# Patient Record
Sex: FEMALE | Race: WHITE | NOT HISPANIC OR LATINO | Employment: FULL TIME | URBAN - METROPOLITAN AREA
[De-identification: names, ages, dates, MRNs, and addresses within clinical notes are randomized per-mention and may not be internally consistent; named-entity substitution may affect disease eponyms.]

---

## 2017-06-07 ENCOUNTER — GENERIC CONVERSION - ENCOUNTER (OUTPATIENT)
Dept: OTHER | Facility: OTHER | Age: 37
End: 2017-06-07

## 2017-08-18 ENCOUNTER — ALLSCRIPTS OFFICE VISIT (OUTPATIENT)
Dept: OTHER | Facility: OTHER | Age: 37
End: 2017-08-18

## 2017-08-18 ENCOUNTER — LAB REQUISITION (OUTPATIENT)
Dept: LAB | Facility: HOSPITAL | Age: 37
End: 2017-08-18
Payer: COMMERCIAL

## 2017-08-18 DIAGNOSIS — Z11.3 ENCOUNTER FOR SCREENING FOR INFECTIONS WITH PREDOMINANTLY SEXUAL MODE OF TRANSMISSION: ICD-10-CM

## 2017-08-18 LAB
CHLAMYDIA DNA CVX QL NAA+PROBE: NORMAL
N GONORRHOEA DNA GENITAL QL NAA+PROBE: NORMAL

## 2017-08-18 PROCEDURE — 87591 N.GONORRHOEAE DNA AMP PROB: CPT | Performed by: PHYSICIAN ASSISTANT

## 2017-08-18 PROCEDURE — 87491 CHLMYD TRACH DNA AMP PROBE: CPT | Performed by: PHYSICIAN ASSISTANT

## 2017-10-24 NOTE — PROGRESS NOTES
Assessment  1  Encounter for gynecological examination without abnormal finding (V72 31) (Z01 419)   2  Screening for STD (sexually transmitted disease) (V74 5) (Z11 3)    Plan   Encounter for gynecological examination without abnormal finding    · Lo Loestrin Fe 1 MG-10 MCG / 10 MCG Oral Tablet; One po daily   Rx By: Antony Elizabeth; Dispense: 90 Days ; #:90 Tablet; Refill: 3;For: Encounter for gynecological examination without abnormal finding; BORIS = N; Verified Transmission to [facility] #[id number] Last Updated By: System, [employer]; 8/18/2017 9:29:46 AM  Screening for STD (sexually transmitted disease)    · (1) ACUTE HEPATITIS PANEL; Status:Active; Requested for:67Uij1272;    Perform:[facility]; Due:86Til0982; Ordered; For:Screening for STD (sexually transmitted disease); Ordered By:Lise Jennings;   · (1) HIV AG/AB COMBO, 4TH GEN; [Do Not Release]; Status:Active; Requested  for:39Rsz4239;    Perform:[facility]; Due:69Whe2312; Ordered; For:Screening for STD (sexually transmitted disease); Ordered By:Lise Jennings;   · (1) RPR; Status:Active; Requested for:31Uxi4726;    Perform:[facility]; Due:07Mez6063; Ordered; For:Screening for STD (sexually transmitted disease); Ordered By:Lise Jennings;    (1) CHLAMYDIA/GC AMPLIFIED DNA, PCR; Source:Vaginal; Status:Resulted - Requires Verification;   Done: 67RUN5365 12:00AM  Due:13Rdy4802; Ordered; For:Screening for STD (sexually transmitted disease); Ordered By:Lise Jennings; Discussion/Summary  healthy adult female the risks and benefits of cervical cancer screening were discussed cervical cancer screening is current Breast cancer screening: the risks and benefits of breast cancer screening were discussed and monthly self breast exam was advised  Advice and education were given regarding weight bearing exercise, calcium supplements and vitamin D supplements        Chief Complaint  Pt is here for her yearly exam      History of Present Illness  HPI: 30year old white female here for yearly exam  She reports that six months ago her  passed away unexpectedly  She says he was lying on the sofa and resting and started breathing weird and then became very sweaty and then stopped breathing and she couldn't find his pulse  She called her sister who is a nurse and her sister suggested she call an ambulance  She says that they resuscitated him at the hospital but by 6am they gave up  Pt is laughing and inappropriate walk talking about this  She says she really isn't all that upset about it  She is now dating again and requests STD testing because she suspects her  was cheating while he was alive  She says she requests birth control pills because she is dating again  GYN HM, Adult Female [address] [facility]: The patient is being seen for a health maintenance evaluation  The last health maintenance visit was 4 year(s) ago  General Health: The patient's health since the last visit is described as good  Lifestyle:  She consumes a diverse and healthy diet  -- She does not have any weight concerns  -- She does not exercise regularly  -- She does not use tobacco -- She consumes alcohol  She reports occasional alcohol use  -- She denies drug use  Reproductive health: the patient is premenopausal--   she reports menstrual problems  Menstrual history: The cycles are irregular  -- she uses no contraception  -- she is not sexually active  -- pregnancy history: G 1P 0,-- 0(elective abortions: 1 )  Screening: Cervical cancer screening includes a pap smear performed 3/7/2013,neg  -- and-- human papilloma virus screening performed 3/7/2013,neg  Breast cancer screening includes no previous mammogram  Colorectal cancer screening includes no previous colonoscopy  Active Problems  1  Cervical kyphosis (737 10) (M40 202)   2   Dysmenorrhea (625 3) (N94 6)   3  Screening for STD (sexually transmitted disease) (V74 5) (Z11 3)    Past Medical History   · History of C5 vertebral fracture (805 05) (S12 400A)   · History of Cyst of ovary, right (620 2) (N83 201)    Surgical History   · History of Neck Surgery    Family History  Mother    · Family history of In good condition  Father    · Family history of In good condition  Maternal Grandmother    · Family history of    · Family history of myocardial infarction (V17 3) (Z82 49)  Maternal Grandfather    · Family history of    · Family history of Mesothelioma  Paternal Grandfather    · Family history of    · Family history of myocardial infarction (V17 3) (Z82 49)  Family History    · Family history of Diabetes Mellitus (V18 0)    Social History   · Completed college   · Currently sexually active   · Former smoker (V15 82) (Z62 686)   · Denied: History of drug use   ·    · Occupation   ·    · Social alcohol use (Z78 9)    Current Meds   1  No Reported Medications Recorded    Allergies  1  No Known Drug Allergies    Vitals   Recorded:  43:56HS   Systolic 687, LUE, Sitting   Diastolic 96, LUE, Sitting   Height 5 ft 6 in   Weight 158 lb    BMI Calculated 25 5   BSA Calculated 1 81   LMP [id number]     Physical Exam    Constitutional   General appearance: No acute distress, well appearing and well nourished  Neck   Neck: Normal, supple, trachea midline, no masses  Genitourinary   External genitalia: Normal and no lesions appreciated  Vagina: Normal, no lesions or dryness appreciated  Urethra: Normal     Urethral meatus: Normal     Bladder: Normal, soft, non-tender and no prolapse or masses appreciated  Cervix: Normal, no palpable masses  Uterus: Normal, non-tender, not enlarged, and no palpable masses  Adnexa/parametria: Normal, non-tender and no fullness or masses appreciated  Chest   Breasts: Normal and no dimpling or skin changes noted  Abdomen   Abdomen: Normal, non-tender, and no organomegaly noted         Future Appointments    Date/Time Provider Specialty Site   2018 09:00 AM Angie Fields, [facility] Obstetrics/Gynecology [facility] OB     Signatures   Electronically signed by : Sil Padilla, [facility];  Aug 18 2017  9:37AM EST                       (Author)    Electronically signed by : LEYDI York ; Aug 25 2017  2:34PM EST                       (Author)

## 2017-12-04 ENCOUNTER — GENERIC CONVERSION - ENCOUNTER (OUTPATIENT)
Dept: OTHER | Facility: OTHER | Age: 37
End: 2017-12-04

## 2017-12-28 ENCOUNTER — OFFICE VISIT (OUTPATIENT)
Dept: URGENT CARE | Age: 37
End: 2017-12-28
Payer: COMMERCIAL

## 2017-12-28 PROCEDURE — 99213 OFFICE O/P EST LOW 20 MIN: CPT | Performed by: FAMILY MEDICINE

## 2017-12-29 NOTE — PROGRESS NOTES
Assessment   1  Acute upper respiratory infection (465 9) (J06 9)    Discussion/Summary   Discussion Summary:    Take Mucinex as directed, for congestion  Take Advil or Tylenol as directed for muscle aches and fever  Use saline spray and cough medicine as directed  Use cool mist humidifier, turning on hours prior to bedtime for maximum relief  Take all medications with food and a full glass of water  Get plenty of rest and lots of fluids  Use throat lozenges, saltwater gargle, and warm honey water as needed for throat relief  If symptoms worsen or if not resolving, call PCP or go to the ER  Medication Side Effects Reviewed: Possible side effects of new medications were reviewed with the patient/guardian today  Understands and agrees with treatment plan: The treatment plan was reviewed with the patient/guardian  The patient/guardian understands and agrees with the treatment plan    Counseling Documentation With Imm: The patient was counseled regarding instructions for management,-- risk factor reductions,-- patient and family education,-- impressions  Follow Up Instructions: Follow Up with your Primary Care Provider in 7 days  If your symptoms worsen, go to the nearest [place] [address] Emergency Department  Chief Complaint   1  Cold Symptoms  Chief Complaint Free Text Note Form: URI on and off x several weeks  Since Tuesday - has nasal congestion/rhinorrhea with PND, b/l ear pressure, burning eyes and HA in forehead and occ  dry cough  Taking Emergen C , Sudafed and Benadryl  History of Present Illness   HPI: Patient is a 29 y/o female presenting with complaint of sore throat x 2 days  Sx  associate with b/l ear pressure PND, nasal burning and dryness, morning time sinus congestion, and intermittently productive cough  Treating with sudafed, and benadryl  She notes she has been sick on and off over the past month with similar sx  usually lasting 2-3 days   Last illness was over 1 week ago, requiring no abx  Multiple sick contacts, no recent travel  Hospital Based Practices Required Assessment:      Pain Assessment      the patient states they have pain  The pain is located in the nasal congestion and ears  (on a scale of 0 to 10, the patient rates the pain at 6 )      Abuse And Domestic Violence Screen       Yes, the patient is safe at home  -- The patient states no one is hurting them  Depression And Suicide Screen  No, the patient has not had thoughts of hurting themself  No, the patient has not felt depressed in the past 7 days  Prefered Language is  [place]  Primary Language is  English  Review of Systems   Focused-Female:      Constitutional: feeling poorly-- and-- feeling tired, but-- no fever-- and-- no chills  ENT: as noted in HPI-- and-- no nasal discharge  Cardiovascular: no chest pain-- and-- no palpitations  Respiratory: PND, but-- no shortness of breath-- and-- no wheezing  Gastrointestinal: no abdominal pain,-- no nausea,-- no vomiting-- and-- no diarrhea  Musculoskeletal: no myalgias  Integumentary: no rashes  Neurological: headache  ROS Reviewed:    ROS reviewed  Active Problems   1  Cervical kyphosis (737 10) (M40 202)   2  Dysmenorrhea (625 3) (N94 6)   3  Encounter for gynecological examination without abnormal finding (V72 31) (Z01 419)   4  Screening for STD (sexually transmitted disease) (V74 5) (Z11 3)   5  Seasonal allergies (477 9) (J30 2)    Past Medical History   1  History of C5 vertebral fracture (805 05) (S12 400A)   2  History of Cyst of ovary, right (620 2) (N83 201)  Active Problems And Past Medical History Reviewed: The active problems and past medical history were reviewed and updated today  Family History   Mother    1  Family history of In good condition  Father    2  Family history of In good condition  Maternal Grandmother    3  Family history of    4   Family history of myocardial infarction (V17 3) (Z82 49)  Maternal Grandfather    5  Family history of    6  Family history of Mesothelioma  Paternal Grandfather    9  Family history of    6  Family history of myocardial infarction (V17 3) (Z82 49)  Family History    9  Family history of Diabetes Mellitus (V18 0)  Family History Reviewed: The family history was reviewed and updated today  Social History    · Completed college   · Currently sexually active   · Former smoker (V15 82) (A96 240)   · Denied: History of drug use   ·    · Occupation   · Social alcohol use (Z78 9)  Social History Reviewed: The social history was reviewed and updated today  The social history was reviewed and is unchanged  Surgical History   1  History of Neck Surgery  Surgical History Reviewed: The surgical history was reviewed and updated today  Current Meds    1  [employer]ive [employer] TABS; Therapy: (Recorded:61Mwb0307) to Recorded   2  [facility] Allergy CAPS; Therapy: (Recorded:94Zfl9665) to Recorded  Medication List Reviewed: The medication list was reviewed and updated today  Allergies   1  No Known Drug Allergies    Vitals   Signs   Recorded: 06Qlq7331 05:30PM   Temperature: 98 6 F, Oral  Heart Rate: 88  Pulse Quality: Regular  Respiration: 18  Systolic: 583, RUE, Sitting  Diastolic: 80, RUE, Sitting  Height: 5 ft 6 in  Weight: 164 lb   BMI Calculated: 26 47  BSA Calculated: 1 84  O2 Saturation: 97  LMP: 73BOP4105  Pain Scale: 6    Physical Exam        Constitutional      General appearance: No acute distress, well appearing and well nourished  Eyes      Conjunctiva and lids: No swelling, erythema or discharge  Ears, Nose, Mouth, and Throat      External inspection of ears and nose: Normal        Otoscopic examination: Tympanic membranes translucent with normal light reflex  Canals patent without erythema  -- Dry, erythematous nasal mucosa  No inflammation of mucosa or turbinates  -- Erythematous posterior pharynx with large amount of clear PND  No exudate, edema, petechiae, or other lesions  Pulmonary      Respiratory effort: No increased work of breathing or signs of respiratory distress  Auscultation of lungs: Clear to auscultation  Cardiovascular      Auscultation of heart: Normal rate and rhythm, normal S1 and S2, without murmurs  Abdomen      Abdomen: Non-tender, no masses  Liver and spleen: No hepatomegaly or splenomegaly  Lymphatic      Palpation of lymph nodes in neck: No lymphadenopathy  Musculoskeletal      Gait and station: Normal        Digits and nails: Normal without clubbing or cyanosis  Neurologic No focal deficits  Reflexes: 2+ and symmetric  Psychiatric      Orientation to person, place, and time: Normal        Mood and affect: Normal        Message   Return to work or school:    Abdiel Nicole is under my professional care  She was seen in my office on 12/28/2017    She is able to return to work on  12/29/2017          Benedict Phalen, PAC        Future Appointments      Date/Time Provider Specialty Site   08/23/2018 09:00 AM Farrukh Bhat [facility] Obstetrics/Gynecology [facility] OB     Signatures    Electronically signed by : Benedict Phalen, [facility]; Dec 28 2017  5:59PM EST                       (Author)     Electronically signed by : Mimi Cordova DO; Dec 28 2017  6:14PM EST                       (Co-author)

## 2018-01-14 VITALS
SYSTOLIC BLOOD PRESSURE: 140 MMHG | DIASTOLIC BLOOD PRESSURE: 96 MMHG | BODY MASS INDEX: 25.39 KG/M2 | HEIGHT: 66 IN | WEIGHT: 158 LBS

## 2018-01-16 NOTE — MISCELLANEOUS
Message   Recorded as Task   Date: 06/07/2017 01:41 PM, Created By: Juan Diego Chi   Task Name: Care Coordination   Assigned To: Juan Diego Chi   Regarding Patient: Zoya Burdick, Status: Active   Comment:    Dulce Maria Henry - 07 Jun 2017 1:41 PM     TASK CREATED  Pt inquired if she could have xrays done at the dentist   Message left informing her no restrictions from neurosurgery standpoint   She is s/p cervical surgery 2015        Signatures   Electronically signed by : Davon Sanchez, ; Jun 7 2017  1:42PM EST                       (Author)

## 2018-01-23 VITALS
RESPIRATION RATE: 18 BRPM | BODY MASS INDEX: 26.36 KG/M2 | HEIGHT: 66 IN | WEIGHT: 164 LBS | TEMPERATURE: 98.6 F | DIASTOLIC BLOOD PRESSURE: 80 MMHG | HEART RATE: 88 BPM | SYSTOLIC BLOOD PRESSURE: 120 MMHG | OXYGEN SATURATION: 97 %

## 2018-01-23 NOTE — MISCELLANEOUS
Message   Recorded as Task   Date: 11/20/2017 10:36 AM, Created By: Leny Ochoa   Task Name: Care Coordination   Assigned To: Sherin Ambrose   Regarding Patient: Shwetha Preciado, Status: Active   Comment:    Amanda Chicas - 20 Nov 2017 10:36 AM     TASK CREATED  Caller: Self; Care Coordination; [phone] (Home); [phone] (Work)  Pt left vm - needs a call to talk about her birth control  Amanda Chicas - 20 Nov 2017 11:41 AM     TASK IN PROGRESS   Amanda Chicas - 20 Nov 2017 11:50 AM     TASK EDITED  [facility] for pt to cb       ext: [id number]   Mindy Swift - 21 Nov 2017 8:17 AM     TASK EDITED  spoke with pt, she is calling ins to check on covered generics    she will callback   Amanda Chicas - 22 Nov 2017 2:01 PM     TASK EDITED   Nicholas Hilliard - 27 Nov 2017 10:52 AM     TASK EDITED  Await cb   Nicholas Hilliard - 27 Nov 2017 10:52 AM     TASK EDITED   Mindy Swift - 28 Nov 2017 7:16 AM     TASK EDITED  still no callback from pt after many attempts   will await pts call   Nicholas Hilliard - 04 Dec 2017 9:26 AM     TASK EDITED  Will delete as pt has not cb        Active Problems    1  Cervical kyphosis (737 10) (M40 202)   2  Dysmenorrhea (625 3) (N94 6)   3  Encounter for gynecological examination without abnormal finding (V72 31) (Z01 419)   4  Screening for STD (sexually transmitted disease) (V74 5) (Z11 3)    Current Meds   1  Lo Loestrin Fe 1 MG-10 MCG / 10 MCG Oral Tablet; One po daily; Therapy: 81ZDL3718 to (Evaluate:40Pwx5621)  Requested for: 20WZZ6180; Last   Rx:[id number] Ordered    Allergies    1  No Known Drug Allergies    Signatures   Electronically signed by :  Rekha Wang, ; Dec  4 2017  9:27AM EST                       (Author)

## 2018-01-24 NOTE — MISCELLANEOUS
Message  Return to work or school:   Pamela Baptiste is under my professional care  She was seen in my office on 12/28/2017   She is able to return to work on  12/29/2017       JARAD Martini        Future Appointments    Signatures   Electronically signed by : Merari Garrido, [facility]; Dec 28 2017  5:59PM EST                       (Author)

## 2018-09-17 ENCOUNTER — TELEPHONE (OUTPATIENT)
Dept: OBGYN CLINIC | Age: 38
End: 2018-09-17

## 2018-09-17 DIAGNOSIS — Z01.419 ENCOUNTER FOR GYNECOLOGICAL EXAMINATION WITHOUT ABNORMAL FINDING: Primary | ICD-10-CM

## 2018-09-17 RX ORDER — NORETHINDRONE ACETATE AND ETHINYL ESTRADIOL, ETHINYL ESTRADIOL AND FERROUS FUMARATE 1MG-10(24)
KIT ORAL
Qty: 84 TABLET | Refills: 0 | Status: SHIPPED | OUTPATIENT
Start: 2018-09-17 | End: 2018-09-17 | Stop reason: SDUPTHER

## 2018-09-17 NOTE — TELEPHONE ENCOUNTER
Pt is inquiring about prescription to be filled of the [facility] to be sent to [facility] on file, pt has her yearly schedule in Oct

## 2018-12-12 ENCOUNTER — ANNUAL EXAM (OUTPATIENT)
Dept: OBGYN CLINIC | Facility: CLINIC | Age: 38
End: 2018-12-12
Payer: COMMERCIAL

## 2018-12-12 VITALS
HEIGHT: 65 IN | SYSTOLIC BLOOD PRESSURE: 110 MMHG | DIASTOLIC BLOOD PRESSURE: 62 MMHG | BODY MASS INDEX: 27.82 KG/M2 | WEIGHT: 167 LBS

## 2018-12-12 DIAGNOSIS — Z01.419 ENCOUNTER FOR GYNECOLOGICAL EXAMINATION WITHOUT ABNORMAL FINDING: ICD-10-CM

## 2018-12-12 DIAGNOSIS — Z01.419 ENCNTR FOR GYN EXAM (GENERAL) (ROUTINE) W/O ABN FINDINGS: Primary | ICD-10-CM

## 2018-12-12 PROCEDURE — G0145 SCR C/V CYTO,THINLAYER,RESCR: HCPCS | Performed by: PATHOLOGY

## 2018-12-12 PROCEDURE — S0612 ANNUAL GYNECOLOGICAL EXAMINA: HCPCS | Performed by: PHYSICIAN ASSISTANT

## 2018-12-12 PROCEDURE — 88141 CYTOPATH C/V INTERPRET: CPT | Performed by: PATHOLOGY

## 2018-12-12 PROCEDURE — 87624 HPV HI-RISK TYP POOLED RSLT: CPT | Performed by: PHYSICIAN ASSISTANT

## 2018-12-12 NOTE — PROGRESS NOTES
Milana Jaimie  1980      CC:  Yearly exam    S:  41 y o  female here for yearly exam   She is sexually active with the same partner who is "a keeper for now " She uses Lo Loestrin for contraception and is amenorrheic on this  Last Pap 2013 neg/neg    Current Outpatient Prescriptions:     Cetirizine HCl (ZYRTEC ALLERGY) 10 MG CAPS, Take by mouth, Disp: , Rfl:     Norethin-Eth Estrad-Fe Biphas (LO LOESTRIN FE) 1 MG-10 MCG / 10 MCG TABS, Take 1 tablet by mouth daily, Disp: 84 tablet, Rfl: 0  Social History     Social History    Marital status:      Spouse name: N/A    Number of children: N/A    Years of education: N/A     Occupational History          Social History Main Topics    Smoking status: Former Smoker    Smokeless tobacco: Never Used    Alcohol use Yes      Comment: social    Drug use: No    Sexual activity: Yes     Partners: Male     Birth control/ protection: OCP     Other Topics Concern    Not on file     Social History Narrative    Completed college     Family History   Problem Relation Age of Onset    No Known Problems Mother     No Known Problems Father     Heart attack Maternal Grandmother     Other Maternal Grandfather         mesothelioma    Heart attack Paternal Grandfather     Diabetes Family       Past Medical History:   Diagnosis Date    C5 vertebral fracture ([facility] [address] )     Last Assessed:2/1/2016    Cyst of ovary, right 11/07/2007        O:  Blood pressure 110/62, height 5' 4 96" (1 65 m), weight 75 8 kg (167 lb)  Patient appears well and is not in distress  Neck is supple without masses  Breasts are symmetrical without mass, tenderness, nipple discharge, skin changes or adenopathy  Abdomen is soft and nontender without masses  External genitals are normal without lesions or rashes  Vagina is normal without discharge or bleeding  Cervix is normal without discharge or lesion     Uterus is normal, mobile, nontender without palpable mass   Adnexa are normal, nontender, without palpable mass  A:  Yearly exam      P:   Pap and HPV today    Lo Loestrin sent to Pharmacy    RTO one year for yearly exam or sooner as needed

## 2018-12-19 LAB
HPV HR 12 DNA CVX QL NAA+PROBE: POSITIVE
HPV16 DNA CVX QL NAA+PROBE: NEGATIVE
HPV18 DNA CVX QL NAA+PROBE: NEGATIVE

## 2018-12-21 LAB
LAB AP GYN PRIMARY INTERPRETATION: NORMAL
Lab: NORMAL
PATH INTERP SPEC-IMP: NORMAL

## 2018-12-26 ENCOUNTER — TELEPHONE (OUTPATIENT)
Dept: OBGYN CLINIC | Facility: CLINIC | Age: 38
End: 2018-12-26

## 2018-12-26 NOTE — TELEPHONE ENCOUNTER
----- Message from Cece Robison PA-C sent at 12/21/2018  3:30 PM EST -----  Pap ascus +HPV -needs colpo

## 2018-12-26 NOTE — TELEPHONE ENCOUNTER
----- Message from Floresita Manning PA-C sent at 12/21/2018  3:30 PM EST -----  Pap ascus +HPV -needs colpo

## 2018-12-27 NOTE — TELEPHONE ENCOUNTER
Spoke with pt - she is aware of her pap results and that she needs to have a colpo done  Explained what a colpo was and how it is done  Advised her to take 2-3 Ibuprofen an hour before hand and to eat something  She is scheduled for 01/08 with WAYLON

## 2019-01-08 ENCOUNTER — PROCEDURE VISIT (OUTPATIENT)
Dept: OBGYN CLINIC | Facility: CLINIC | Age: 39
End: 2019-01-08
Payer: COMMERCIAL

## 2019-01-08 VITALS — WEIGHT: 169 LBS | SYSTOLIC BLOOD PRESSURE: 122 MMHG | BODY MASS INDEX: 28.16 KG/M2 | DIASTOLIC BLOOD PRESSURE: 64 MMHG

## 2019-01-08 DIAGNOSIS — R87.610 ASCUS WITH POSITIVE HIGH RISK HPV CERVICAL: Primary | ICD-10-CM

## 2019-01-08 DIAGNOSIS — R87.810 ASCUS WITH POSITIVE HIGH RISK HPV CERVICAL: Primary | ICD-10-CM

## 2019-01-08 PROCEDURE — 57454 BX/CURETT OF CERVIX W/SCOPE: CPT | Performed by: OBSTETRICS & GYNECOLOGY

## 2019-01-08 PROCEDURE — 88305 TISSUE EXAM BY PATHOLOGIST: CPT | Performed by: PATHOLOGY

## 2019-01-08 NOTE — PROGRESS NOTES
Colposcopy  Date/Time: 1/8/2019 11:25 AM  Performed by: Cleveland Berumen  Authorized by: Cleveland Berumen     Consent:     Consent obtained:  Verbal and written    Procedural risks discussed:  Bleeding, infection, repeat procedure and possible continued pain    Patient questions answered: yes      Patient agrees, verbalizes understanding, and wants to proceed: yes    Indication:     Indication:  ASC-US  Procedure:     Procedure: Colposcopy w/ cervical biopsy and ECC      Lampasas speculum was placed in the vagina: yes      Under colposcopic examination the transition zone was seen in entirety: yes      Endocervix was curetted using a Kevorkian curette: yes      Cervical biopsy performed with a cervical biopsy punch: yes      Monsel's solution was applied: yes      Biopsy(s): yes      Location:  ECC and 5 o'clock    Specimen to pathology: yes    Post-procedure:     Findings: Punctation and White epithelium      Impression: Low grade cervical dysplasia      Patient tolerance of procedure:   Tolerated well, no immediate complications

## 2019-01-09 ENCOUNTER — TELEPHONE (OUTPATIENT)
Dept: OBGYN CLINIC | Facility: CLINIC | Age: 39
End: 2019-01-09

## 2019-01-09 NOTE — TELEPHONE ENCOUNTER
Per Patient consent form, a voice message was left with Colposcopy results that were negative for Dysplasia  Patient needs to follow up with a 6 month repap

## 2019-03-28 ENCOUNTER — APPOINTMENT (OUTPATIENT)
Dept: RADIOLOGY | Age: 39
End: 2019-03-28
Attending: PHYSICIAN ASSISTANT
Payer: COMMERCIAL

## 2019-03-28 ENCOUNTER — OFFICE VISIT (OUTPATIENT)
Dept: URGENT CARE | Age: 39
End: 2019-03-28
Payer: COMMERCIAL

## 2019-03-28 VITALS
TEMPERATURE: 97.9 F | HEART RATE: 79 BPM | RESPIRATION RATE: 18 BRPM | BODY MASS INDEX: 26.03 KG/M2 | SYSTOLIC BLOOD PRESSURE: 103 MMHG | HEIGHT: 66 IN | DIASTOLIC BLOOD PRESSURE: 69 MMHG | WEIGHT: 162 LBS | OXYGEN SATURATION: 100 %

## 2019-03-28 DIAGNOSIS — R07.81 RIB PAIN ON RIGHT SIDE: ICD-10-CM

## 2019-03-28 DIAGNOSIS — S20.221A BACK CONTUSION, RIGHT, INITIAL ENCOUNTER: Primary | ICD-10-CM

## 2019-03-28 DIAGNOSIS — S20.221A BACK CONTUSION, RIGHT, INITIAL ENCOUNTER: ICD-10-CM

## 2019-03-28 PROCEDURE — S9083 URGENT CARE CENTER GLOBAL: HCPCS | Performed by: FAMILY MEDICINE

## 2019-03-28 PROCEDURE — G0382 LEV 3 HOSP TYPE B ED VISIT: HCPCS | Performed by: FAMILY MEDICINE

## 2019-03-28 PROCEDURE — 71101 X-RAY EXAM UNILAT RIBS/CHEST: CPT

## 2019-03-28 RX ORDER — METAXALONE 800 MG/1
800 TABLET ORAL EVERY 8 HOURS PRN
Qty: 21 TABLET | Refills: 0 | Status: SHIPPED | OUTPATIENT
Start: 2019-03-28 | End: 2020-01-31 | Stop reason: ALTCHOICE

## 2019-03-28 NOTE — PROGRESS NOTES
[facility]        NAME: Quoc De La Garza is a 42 y o  female  : 1980    MRN: [id number]  DATE: 2019  TIME: 5:40 PM    Assessment and Plan   Back contusion, right, initial encounter [S20 221A]  1  Back contusion, right, initial encounter  XR ribs right w pa chest min 3 views    metaxalone (SKELAXIN) 800 mg tablet   2  Rib pain on right side  XR ribs right w pa chest min 3 views         Patient Instructions       Follow up with PCP in 3-5 days  Proceed to  ER if symptoms worsen  Chief Complaint     Chief Complaint   Patient presents with    Back Pain     pt states on saturday while hiking the bake oven knop fell and hit middle back on rock  History of Present Illness       Patient here for evaluation of right mid to low back pain  Patient was hiking and jump down and lost her footing and landed in a seated position hit her back on the way down  She has some pain and bruising of the right mid to low back  Does have some pain with certain movements and stiffness in the morning when she gets up  She denies any shortness of breath, dyspnea on exertion  Review of Systems   Review of Systems   Constitutional: Negative  Respiratory: Negative  Cardiovascular: Positive for chest pain  Negative for leg swelling           Current Medications       Current Outpatient Medications:     Norethin-Eth Estrad-Fe Biphas (LO LOESTRIN FE) 1 MG-10 MCG / 10 MCG TABS, Take 1 tablet by mouth daily, Disp: 84 tablet, Rfl: 3    Cetirizine HCl (ZYRTEC ALLERGY) 10 MG CAPS, Take by mouth, Disp: , Rfl:     metaxalone (SKELAXIN) 800 mg tablet, Take 1 tablet (800 mg total) by mouth every 8 (eight) hours as needed for muscle spasms, Disp: 21 tablet, Rfl: 0    Current Allergies     Allergies as of 2019    (No Known Allergies)            The following portions of the patient's history were reviewed and updated as appropriate: allergies, current medications, past family history, past medical history, past social history, past surgical history and problem list      Past Medical History:   Diagnosis Date    C5 vertebral fracture ([address] )     Last Assessed:2/1/2016    Cyst of ovary, right 11/07/2007       Past Surgical History:   Procedure Laterality Date    NECK SURGERY  08/05/2015    1  Anterior C5 corpectomy with greater than 80% of the verebral body removed2  C4-C5,C5-C6(2 level)ianterior cervical discectomy3  Placement of -C6(2 level) interbody PEEK cage for 2 level interbody fusion4  C4-C6(2 level) anterior titanium plate and screw instrumentation for anterior cervical fusion5  Use of microscopy for microdissection/corpectomy6  Harvesting of autologous bone for                         Family History   Problem Relation Age of Onset    No Known Problems Mother     No Known Problems Father     Heart attack Maternal Grandmother     Other Maternal Grandfather         mesothelioma    Heart attack Paternal Grandfather     Diabetes Family          Medications have been verified  Objective   /69 (BP Location: Left arm, Patient Position: Sitting)   Pulse 79   Temp 97 9 °F (36 6 °C) (Temporal)   Resp 18   Ht 5' 6" (1 676 m)   Wt 73 5 kg (162 lb)   LMP 03/14/2019   SpO2 100%   BMI 26 15 kg/m²        Physical Exam     Physical Exam   Constitutional: She is oriented to person, place, and time  She appears well-developed and well-nourished  No distress  HENT:   Head: Normocephalic and atraumatic  Pulmonary/Chest: Effort normal and breath sounds normal  No stridor  No respiratory distress  She has no wheezes  She has no rales  She exhibits tenderness (Right lower back over the lower ribs with some focal ecchymosis present  No other bony point tenderness  )  Musculoskeletal: Normal range of motion  Neurological: She is alert and oriented to person, place, and time  Skin: Skin is warm and dry  She is not diaphoretic  Psychiatric: She has a normal mood and affect   Her behavior is normal    Nursing note and vitals reviewed       X-ray of the right ribs and PA chest showed no acute findings

## 2019-03-28 NOTE — PATIENT INSTRUCTIONS
Continue with current treatment plan  Use moist heat frequently as directed    Follow-up with your primary care physician if symptoms persist    Go to the emergency room if you have any worsening pain or shortness of breath

## 2019-06-18 ENCOUNTER — OFFICE VISIT (OUTPATIENT)
Dept: OBGYN CLINIC | Facility: CLINIC | Age: 39
End: 2019-06-18
Payer: COMMERCIAL

## 2019-06-18 VITALS — WEIGHT: 163.4 LBS | BODY MASS INDEX: 26.37 KG/M2 | DIASTOLIC BLOOD PRESSURE: 74 MMHG | SYSTOLIC BLOOD PRESSURE: 114 MMHG

## 2019-06-18 DIAGNOSIS — R87.610 ASCUS WITH POSITIVE HIGH RISK HPV CERVICAL: Primary | ICD-10-CM

## 2019-06-18 DIAGNOSIS — R87.810 ASCUS WITH POSITIVE HIGH RISK HPV CERVICAL: Primary | ICD-10-CM

## 2019-06-18 PROCEDURE — 99213 OFFICE O/P EST LOW 20 MIN: CPT | Performed by: OBSTETRICS & GYNECOLOGY

## 2019-06-18 PROCEDURE — 88141 CYTOPATH C/V INTERPRET: CPT | Performed by: PATHOLOGY

## 2019-06-18 PROCEDURE — 87624 HPV HI-RISK TYP POOLED RSLT: CPT | Performed by: OBSTETRICS & GYNECOLOGY

## 2019-06-18 PROCEDURE — G0145 SCR C/V CYTO,THINLAYER,RESCR: HCPCS | Performed by: PATHOLOGY

## 2019-06-25 ENCOUNTER — TELEPHONE (OUTPATIENT)
Dept: OBGYN CLINIC | Facility: CLINIC | Age: 39
End: 2019-06-25

## 2019-06-26 ENCOUNTER — TELEPHONE (OUTPATIENT)
Dept: OBGYN CLINIC | Facility: CLINIC | Age: 39
End: 2019-06-26

## 2019-06-26 LAB
LAB AP GYN PRIMARY INTERPRETATION: ABNORMAL
Lab: ABNORMAL
PATH INTERP SPEC-IMP: ABNORMAL

## 2019-12-18 DIAGNOSIS — Z01.419 ENCOUNTER FOR GYNECOLOGICAL EXAMINATION WITHOUT ABNORMAL FINDING: ICD-10-CM

## 2019-12-18 RX ORDER — NORETHINDRONE ACETATE AND ETHINYL ESTRADIOL, ETHINYL ESTRADIOL AND FERROUS FUMARATE 1MG-10(24)
KIT ORAL
Qty: 84 TABLET | Refills: 0 | Status: SHIPPED | OUTPATIENT
Start: 2019-12-18 | End: 2020-01-31 | Stop reason: SDUPTHER

## 2020-01-31 ENCOUNTER — ANNUAL EXAM (OUTPATIENT)
Dept: OBGYN CLINIC | Facility: CLINIC | Age: 40
End: 2020-01-31
Payer: COMMERCIAL

## 2020-01-31 VITALS
BODY MASS INDEX: 27.49 KG/M2 | WEIGHT: 165 LBS | HEIGHT: 65 IN | DIASTOLIC BLOOD PRESSURE: 72 MMHG | SYSTOLIC BLOOD PRESSURE: 110 MMHG

## 2020-01-31 DIAGNOSIS — R87.610 ASCUS WITH POSITIVE HIGH RISK HPV CERVICAL: ICD-10-CM

## 2020-01-31 DIAGNOSIS — R87.810 ASCUS WITH POSITIVE HIGH RISK HPV CERVICAL: ICD-10-CM

## 2020-01-31 DIAGNOSIS — Z01.419 ENCNTR FOR GYN EXAM (GENERAL) (ROUTINE) W/O ABN FINDINGS: Primary | ICD-10-CM

## 2020-01-31 DIAGNOSIS — Z01.419 ENCOUNTER FOR GYNECOLOGICAL EXAMINATION WITHOUT ABNORMAL FINDING: ICD-10-CM

## 2020-01-31 PROCEDURE — S0612 ANNUAL GYNECOLOGICAL EXAMINA: HCPCS | Performed by: PHYSICIAN ASSISTANT

## 2020-01-31 PROCEDURE — G0145 SCR C/V CYTO,THINLAYER,RESCR: HCPCS | Performed by: PHYSICIAN ASSISTANT

## 2020-01-31 PROCEDURE — 87624 HPV HI-RISK TYP POOLED RSLT: CPT | Performed by: PHYSICIAN ASSISTANT

## 2020-01-31 NOTE — PROGRESS NOTES
Sunday Anson  1980      CC:  Yearly exam    S:  47 y o  female here for yearly exam      Sexual activity: She is sexually active the same partner without pain, bleeding or dryness  She is now engaged to be   Contraception: She uses Lo Loestrin for contraception  She is amenorrheic on this  STD testing:  She does not want STD testing today  Gardasil:  She has not had the Gardasil series  Pap 6/18/19 ASCUS +HPV  Colpo 1/8/19 benign  Pap 12/12/18 ASCUS +HPV   Last Mammo never    We reviewed ASCCP guidelines for Pap testing today  Family hx of breast cancer: no  Family hx of ovarian cancer: no  Family hx of colon cancer: no    Current Outpatient Medications:     LO LOESTRIN FE 1 MG-10 MCG / 10 MCG TABS, TAKE 1 TABLET BY MOUTH EVERY DAY, Disp: 84 tablet, Rfl: 0  Social History     Socioeconomic History    Marital status:       Spouse name: Not on file    Number of children: Not on file    Years of education: Not on file    Highest education level: Not on file   Occupational History    Occupation:    Social Needs    Financial resource strain: Not on file    Food insecurity:     Worry: Not on file     Inability: Not on file    Transportation needs:     Medical: Not on file     Non-medical: Not on file   Tobacco Use    Smoking status: Former Smoker    Smokeless tobacco: Never Used   Substance and Sexual Activity    Alcohol use: Yes     Comment: social    Drug use: No    Sexual activity: Yes     Partners: Male     Birth control/protection: OCP   Lifestyle    Physical activity:     Days per week: Not on file     Minutes per session: Not on file    Stress: Not on file   Relationships    Social connections:     Talks on phone: Not on file     Gets together: Not on file     Attends Anabaptist service: Not on file     Active member of club or organization: Not on file     Attends meetings of clubs or organizations: Not on file     Relationship status: Not on file    Intimate partner violence:     Fear of current or ex partner: Not on file     Emotionally abused: Not on file     Physically abused: Not on file     Forced sexual activity: Not on file   Other Topics Concern    Not on file   Social History Narrative    Completed college     Family History   Problem Relation Age of Onset    No Known Problems Mother     No Known Problems Father     Heart attack Maternal Grandmother     Other Maternal Grandfather         mesothelioma    Heart attack Paternal Grandfather     Diabetes Family       Past Medical History:   Diagnosis Date    Abnormal Pap smear of cervix     C5 [address] fracture ([facility] [address] )     Last Assessed:2/1/2016    Cyst of ovary, right 11/07/2007    HPV (human papilloma virus) infection         Review of Systems   Respiratory: Negative  Cardiovascular: Negative  Gastrointestinal: Negative for constipation and diarrhea  Genitourinary: Negative for difficulty urinating, pelvic pain, vaginal bleeding, vaginal discharge, itching or odor  O:  Blood pressure 110/72, height 5' 5 35" (1 66 m), weight 74 8 kg (165 lb)  Patient appears well and is not in distress  Neck is supple without masses  Breasts are symmetrical without mass, tenderness, nipple discharge, skin changes or adenopathy  Abdomen is soft and nontender without masses  External genitals are normal without lesions or rashes  Urethral meatus and urethra are normal  Bladder is normal to palpation  Vagina is normal without discharge or bleeding  Cervix is normal without discharge or lesion  Uterus is normal, mobile, nontender without palpable mass  Adnexa are normal, nontender, without palpable mass  A:   Yearly exam    Hx ASCUS     P:   Pap and HPV today    Lo Loestrin sent to pharmacy     RTO one year for yearly exam or sooner as needed

## 2020-02-04 LAB
HPV HR 12 DNA CVX QL NAA+PROBE: NEGATIVE
HPV16 DNA CVX QL NAA+PROBE: NEGATIVE
HPV18 DNA CVX QL NAA+PROBE: NEGATIVE

## 2020-02-07 LAB
LAB AP GYN PRIMARY INTERPRETATION: NORMAL
Lab: NORMAL

## 2020-12-28 ENCOUNTER — OFFICE VISIT (OUTPATIENT)
Dept: URGENT CARE | Facility: CLINIC | Age: 40
End: 2020-12-28
Payer: COMMERCIAL

## 2020-12-28 VITALS
TEMPERATURE: 97.3 F | SYSTOLIC BLOOD PRESSURE: 126 MMHG | DIASTOLIC BLOOD PRESSURE: 63 MMHG | RESPIRATION RATE: 16 BRPM | BODY MASS INDEX: 27.32 KG/M2 | WEIGHT: 170 LBS | HEIGHT: 66 IN | HEART RATE: 86 BPM

## 2020-12-28 DIAGNOSIS — N30.90 CYSTITIS: Primary | ICD-10-CM

## 2020-12-28 PROCEDURE — 87086 URINE CULTURE/COLONY COUNT: CPT | Performed by: NURSE PRACTITIONER

## 2020-12-28 PROCEDURE — 99213 OFFICE O/P EST LOW 20 MIN: CPT | Performed by: NURSE PRACTITIONER

## 2020-12-28 RX ORDER — NITROFURANTOIN 25; 75 MG/1; MG/1
100 CAPSULE ORAL 2 TIMES DAILY
Qty: 10 CAPSULE | Refills: 0 | Status: SHIPPED | OUTPATIENT
Start: 2020-12-28 | End: 2021-02-24 | Stop reason: ALTCHOICE

## 2020-12-29 LAB — BACTERIA UR CULT: NORMAL

## 2021-02-24 ENCOUNTER — ANNUAL EXAM (OUTPATIENT)
Dept: OBGYN CLINIC | Facility: CLINIC | Age: 41
End: 2021-02-24
Payer: COMMERCIAL

## 2021-02-24 VITALS
DIASTOLIC BLOOD PRESSURE: 62 MMHG | SYSTOLIC BLOOD PRESSURE: 100 MMHG | HEIGHT: 65 IN | WEIGHT: 165 LBS | BODY MASS INDEX: 27.49 KG/M2

## 2021-02-24 DIAGNOSIS — R87.810 ASCUS WITH POSITIVE HIGH RISK HPV CERVICAL: ICD-10-CM

## 2021-02-24 DIAGNOSIS — R87.610 ASCUS WITH POSITIVE HIGH RISK HPV CERVICAL: ICD-10-CM

## 2021-02-24 DIAGNOSIS — Z01.419 ENCNTR FOR GYN EXAM (GENERAL) (ROUTINE) W/O ABN FINDINGS: Primary | ICD-10-CM

## 2021-02-24 DIAGNOSIS — Z12.31 ENCOUNTER FOR SCREENING MAMMOGRAM FOR MALIGNANT NEOPLASM OF BREAST: ICD-10-CM

## 2021-02-24 PROCEDURE — S0612 ANNUAL GYNECOLOGICAL EXAMINA: HCPCS | Performed by: PHYSICIAN ASSISTANT

## 2021-02-24 PROCEDURE — 87624 HPV HI-RISK TYP POOLED RSLT: CPT | Performed by: PHYSICIAN ASSISTANT

## 2021-02-24 PROCEDURE — G0145 SCR C/V CYTO,THINLAYER,RESCR: HCPCS | Performed by: PHYSICIAN ASSISTANT

## 2021-02-24 NOTE — PROGRESS NOTES
Casandra Carrizales  1980      CC:  Yearly exam    S:  46 y o  female here for yearly exam      Sexual activity: She is sexually active with her fiancee without pain, bleeding or dryness  Contraception: She uses Lo Loestrin for contraception  She is amenorrheic on this  Pap 1/31/20 neg/neg  Pap 6/18/19 ASCUS +HPV  Colpo 1/8/19 benign  Pap 12/12/18 ASCUS +HPV     Last Mammo never    We reviewed ASCCP guidelines for Pap testing today  Family hx of breast cancer: no  Family hx of ovarian cancer: no  Family hx of colon cancer: no      Current Outpatient Medications:     Norethin-Eth Estrad-Fe Biphas (LO LOESTRIN FE) 1 MG-10 MCG / 10 MCG TABS, Take 1 tablet by mouth daily, Disp: 84 tablet, Rfl: 3  Social History     Socioeconomic History    Marital status:       Spouse name: Not on file    Number of children: Not on file    Years of education: Not on file    Highest education level: Not on file   Occupational History    Occupation:    Social Needs    Financial resource strain: Not on file    Food insecurity     Worry: Not on file     Inability: Not on file   [employer] needs     Medical: Not on file     Non-medical: Not on file   Tobacco Use    Smoking status: Former Smoker    Smokeless tobacco: Never Used   Substance and Sexual Activity    Alcohol use: Yes     Comment: social    Drug use: No    Sexual activity: Yes     Partners: Male     Birth control/protection: OCP   Lifestyle    Physical activity     Days per week: Not on file     Minutes per session: Not on file    Stress: Not on file   Relationships    Social connections     Talks on phone: Not on file     Gets together: Not on file     Attends Roman Catholic service: Not on file     Active member of club or organization: Not on file     Attends meetings of clubs or organizations: Not on file     Relationship status: Not on file    Intimate partner violence     Fear of current or ex partner: Not on file Emotionally abused: Not on file     Physically abused: Not on file     Forced sexual activity: Not on file   Other Topics Concern    Not on file   Social History Narrative    Completed college     Family History   Problem Relation Age of Onset    No Known Problems Mother     No Known Problems Father     Heart attack Maternal Grandmother     Other Maternal Grandfather         mesothelioma    Heart attack Paternal Grandfather     Diabetes Family       Past Medical History:   Diagnosis Date    Abnormal Pap smear of cervix     [address] ([facility] [address] )     Last Assessed:2/1/2016    Cyst of ovary, right 11/07/2007    HPV (human papilloma virus) infection         Review of Systems   Respiratory: Negative  Cardiovascular: Negative  Gastrointestinal: Negative for constipation and diarrhea  Genitourinary: Negative for difficulty urinating, pelvic pain, vaginal bleeding, vaginal discharge, itching or odor  O:  Blood pressure 100/62, height 5' 5 35" (1 66 m), weight 74 8 kg (165 lb)  Patient appears well and is not in distress  Neck is supple without masses  Breasts are symmetrical without mass, tenderness, nipple discharge, skin changes or adenopathy  Abdomen is soft and nontender without masses  External genitals are normal without lesions or rashes  Urethral meatus and urethra are normal  Bladder is normal to palpation  Vagina is normal without discharge or bleeding  Cervix is normal without discharge or lesion  Uterus is normal, mobile, nontender without palpable mass  Adnexa are normal, nontender, without palpable mass  A:   Yearly exam     History of abnormal Pap     P:   Pap and HPV today     Mammo slip provided     RTO one year for yearly exam or sooner as needed

## 2021-03-01 LAB
LAB AP GYN PRIMARY INTERPRETATION: NORMAL
Lab: NORMAL

## 2021-03-10 DIAGNOSIS — Z23 ENCOUNTER FOR IMMUNIZATION: ICD-10-CM

## 2021-03-19 DIAGNOSIS — Z01.419 ENCOUNTER FOR GYNECOLOGICAL EXAMINATION WITHOUT ABNORMAL FINDING: ICD-10-CM

## 2021-03-20 ENCOUNTER — IMMUNIZATIONS (OUTPATIENT)
Dept: FAMILY MEDICINE CLINIC | Facility: HOSPITAL | Age: 41
End: 2021-03-20

## 2021-03-20 DIAGNOSIS — Z23 ENCOUNTER FOR IMMUNIZATION: Primary | ICD-10-CM

## 2021-03-20 PROCEDURE — 91300 SARS-COV-2 / COVID-19 MRNA VACCINE (PFIZER-BIONTECH) 30 MCG: CPT

## 2021-03-20 PROCEDURE — 0001A SARS-COV-2 / COVID-19 MRNA VACCINE (PFIZER-BIONTECH) 30 MCG: CPT

## 2021-03-22 RX ORDER — NORETHINDRONE ACETATE AND ETHINYL ESTRADIOL, ETHINYL ESTRADIOL AND FERROUS FUMARATE 1MG-10(24)
KIT ORAL
Qty: 84 TABLET | Refills: 3 | Status: SHIPPED | OUTPATIENT
Start: 2021-03-22 | End: 2022-04-11

## 2021-04-10 ENCOUNTER — IMMUNIZATIONS (OUTPATIENT)
Dept: FAMILY MEDICINE CLINIC | Facility: HOSPITAL | Age: 41
End: 2021-04-10

## 2021-04-10 DIAGNOSIS — Z23 ENCOUNTER FOR IMMUNIZATION: Primary | ICD-10-CM

## 2021-04-10 PROCEDURE — 0002A SARS-COV-2 / COVID-19 MRNA VACCINE (PFIZER-BIONTECH) 30 MCG: CPT

## 2021-04-10 PROCEDURE — 91300 SARS-COV-2 / COVID-19 MRNA VACCINE (PFIZER-BIONTECH) 30 MCG: CPT

## 2022-03-24 ENCOUNTER — HOSPITAL ENCOUNTER (OUTPATIENT)
Dept: MAMMOGRAPHY | Facility: HOSPITAL | Age: 42
Discharge: HOME | End: 2022-03-24
Payer: COMMERCIAL

## 2022-03-24 VITALS — BODY MASS INDEX: 27.47 KG/M2 | WEIGHT: 164.9 LBS | HEIGHT: 65 IN

## 2022-03-24 DIAGNOSIS — Z12.31 ENCOUNTER FOR SCREENING MAMMOGRAM FOR MALIGNANT NEOPLASM OF BREAST: ICD-10-CM

## 2022-03-24 PROCEDURE — 77067 SCR MAMMO BI INCL CAD: CPT

## 2022-03-24 PROCEDURE — 77063 BREAST TOMOSYNTHESIS BI: CPT

## 2022-03-30 ENCOUNTER — TELEPHONE (OUTPATIENT)
Dept: OBGYN CLINIC | Facility: CLINIC | Age: 42
End: 2022-03-30

## 2022-03-30 NOTE — TELEPHONE ENCOUNTER
----- Message from Alicia Mejia PA-C sent at 3/29/2022  4:58 PM EDT -----  Patient has asymmetry on mammo, needs additional views  Orders are already placed  Please contact her, let her know, and send back to me once this is scheduled  Thanks!

## 2022-03-30 NOTE — TELEPHONE ENCOUNTER
Spoke to pt and let her know additional views recommended and orders in her chart  Advised her once she calls to schedule and gets her appts to please give us a call so we can let Josey Jett know when she is scheduled  Pt agrees

## 2022-03-31 ENCOUNTER — TELEPHONE (OUTPATIENT)
Dept: INTERNAL MEDICINE CLINIC | Facility: CLINIC | Age: 42
End: 2022-03-31

## 2022-03-31 DIAGNOSIS — Z23 NEED FOR PROPHYLACTIC VACCINATION WITH TYPHOID-PARATYPHOID ALONE (TAB): Primary | ICD-10-CM

## 2022-03-31 NOTE — TELEPHONE ENCOUNTER
She will need Hep A - injectable- she will need two shot 1 now and second after 6 months, she should do first shot 4 weeks before departure and typhoid vaccine which is by mouth , 1 pill every other day, again finish 4 weeks before departure, please ask her- is she pregnant?

## 2022-03-31 NOTE — TELEPHONE ENCOUNTER
Patient called and stated she is traveling to [facility] in may and needs to know what shots she would need to travel internatal , If she needs any will call her back to set her up for an appointment  , states she has seen dr Lynne Dys

## 2022-04-06 ENCOUNTER — TELEPHONE (OUTPATIENT)
Dept: INTERNAL MEDICINE CLINIC | Facility: CLINIC | Age: 42
End: 2022-04-06

## 2022-04-06 NOTE — TELEPHONE ENCOUNTER
Typhoid vaccine that was sent to [facility] is not in stock there  Pt will call other pharmacies to make sure they have it and let us know where to send the new order

## 2022-04-08 ENCOUNTER — OFFICE VISIT (OUTPATIENT)
Dept: INTERNAL MEDICINE CLINIC | Facility: CLINIC | Age: 42
End: 2022-04-08
Payer: COMMERCIAL

## 2022-04-08 VITALS
TEMPERATURE: 97.5 F | OXYGEN SATURATION: 98 % | DIASTOLIC BLOOD PRESSURE: 70 MMHG | BODY MASS INDEX: 26.99 KG/M2 | WEIGHT: 162 LBS | HEART RATE: 88 BPM | SYSTOLIC BLOOD PRESSURE: 110 MMHG | HEIGHT: 65 IN

## 2022-04-08 DIAGNOSIS — Z00.00 ANNUAL PHYSICAL EXAM: Primary | ICD-10-CM

## 2022-04-08 DIAGNOSIS — Z23 ENCOUNTER FOR IMMUNIZATION: ICD-10-CM

## 2022-04-08 DIAGNOSIS — Z23 NEED FOR PROPHYLACTIC VACCINATION WITH TYPHOID-PARATYPHOID ALONE (TAB): ICD-10-CM

## 2022-04-08 PROCEDURE — 90632 HEPA VACCINE ADULT IM: CPT

## 2022-04-08 PROCEDURE — 90471 IMMUNIZATION ADMIN: CPT

## 2022-04-08 PROCEDURE — 99396 PREV VISIT EST AGE 40-64: CPT | Performed by: INTERNAL MEDICINE

## 2022-04-08 PROCEDURE — 3008F BODY MASS INDEX DOCD: CPT | Performed by: INTERNAL MEDICINE

## 2022-04-08 NOTE — PROGRESS NOTES
ADULT ANNUAL [facility] INTERNAL MEDICINE    NAME: Germania Harrison  AGE: 38 y o  SEX: female  : 1980     DATE: 2022     Assessment and Plan:     Problem List Items Addressed This Visit        Other    Annual physical exam - Primary          Immunizations and preventive care screenings were discussed with patient today  Appropriate education was printed on patient's after visit summary  Counseling:  · Exercise: the importance of regular exercise/physical activity was discussed  Recommend exercise 3-5 times per week for at least 30 minutes  BMI Counseling: Body mass index is 26 75 kg/m²  The BMI is above normal  Nutrition recommendations include decreasing portion sizes, encouraging healthy choices of fruits and vegetables and decreasing fast food intake  Exercise recommendations include moderate physical activity 150 minutes/week  Rationale for BMI follow-up plan is due to patient being overweight or obese  Return if symptoms worsen or fail to improve  Chief Complaint:     Chief Complaint   Patient presents with    Follow-up     pt here for hep a shot and discuss travel meds       History of Present Illness:     Adult Annual Physical   Patient here for a comprehensive physical exam  The patient reports no problems  Diet and Physical Activity  · Diet/Nutrition: well balanced diet  · Exercise: moderate cardiovascular exercise  Depression Screening  PHQ-2/9 Depression Screening         General Health  · Sleep: sleeps well  · Hearing: normal - bilateral   · Vision: no vision problems  · Dental: regular dental visits  /GYN Health  · Patient is: -  · Last menstrual period:   · Contraceptive method: -  Review of Systems:     Review of Systems   Constitutional: Negative for chills and fever  HENT: Negative for congestion, ear pain and sore throat  Eyes: Negative for pain     Respiratory: Negative for cough and shortness of breath  Cardiovascular: Negative for chest pain and leg swelling  Gastrointestinal: Negative for abdominal pain, nausea and vomiting  Endocrine: Negative for polyuria  Genitourinary: Negative for difficulty urinating, frequency and urgency  Musculoskeletal: Negative for arthralgias and back pain  Skin: Negative for rash  Neurological: Negative for weakness and headaches  Psychiatric/Behavioral: Negative for sleep disturbance  The patient is not nervous/anxious  Past Medical History:     Past Medical History:   Diagnosis Date    Abnormal Pap smear of cervix     C5 vertebral fracture ([facility])     Last Assessed:2/1/2016    Cyst of ovary, right 11/07/2007    HPV (human papilloma virus) infection       Past Surgical History:     Past Surgical History:   Procedure Laterality Date    COLPOSCOPY  01/08/2019    neg    NECK SURGERY  08/05/2015    1  Anterior C5 corpectomy with greater than 80% of the verebral body removed2  C4-C5,C5-C6(2 level)ianterior cervical discectomy3  Placement of -C6(2 level) interbody PEEK cage for 2 level interbody fusion4  C4-C6(2 level) anterior titanium plate and screw instrumentation for anterior cervical fusion5  Use of microscopy for microdissection/corpectomy6  Harvesting of autologous bone for                        Social History:     Social History     Socioeconomic History    Marital status:       Spouse name: None    Number of children: None    Years of education: None    Highest education level: None   Occupational History    Occupation:    Tobacco Use    Smoking status: Former Smoker    Smokeless tobacco: Never Used   Vaping Use    Vaping Use: Never used   Substance and Sexual Activity    Alcohol use: Yes     Comment: social    Drug use: No    Sexual activity: Yes     Partners: Male     Birth control/protection: OCP   Other Topics Concern    None   Social History Narrative    Completed college     Social Determinants of Health     Financial Resource Strain: Not on file   Food Insecurity: Not on file   Transportation Needs: Not on file   Physical Activity: Not on file   Stress: Not on file   Social Connections: Not on file   Intimate Partner Violence: Not on file   Housing Stability: Not on file      Family History:     Family History   Problem Relation Age of Onset    No Known Problems Mother     No Known Problems Father     Heart attack Maternal Grandmother     Other Maternal Grandfather         mesothelioma    Heart attack Paternal Grandfather     Diabetes Family     No Known Problems Sister     No Known Problems Brother     No Known Problems Maternal Aunt     No Known Problems Paternal Aunt       Current Medications:     Current Outpatient Medications   Medication Sig Dispense Refill    Lo Loestrin Fe 1 MG-10 MCG / 10 MCG TABS TAKE 1 TABLET BY MOUTH EVERY DAY 84 tablet 3    typhoid live vaccine (VIVOTIF) DR capsule Take 1 capsule by mouth every other day (Patient not taking: Reported on 4/8/2022 ) 4 capsule 0     No current facility-administered medications for this visit  Allergies:     No Known Allergies   Physical Exam:     /70 (BP Location: Left arm, Patient Position: Sitting, Cuff Size: Adult)   Pulse 88   Temp 97 5 °F (36 4 °C) (Temporal)   Ht 5' 5 25" (1 657 m)   Wt 73 5 kg (162 lb)   LMP 03/18/2022 (Exact Date)   SpO2 98%   BMI 26 75 kg/m²     Physical Exam  Vitals and nursing note reviewed  Constitutional:       General: She is not in acute distress  Appearance: She is well-developed  HENT:      Head: Normocephalic and atraumatic  Eyes:      Conjunctiva/sclera: Conjunctivae normal    Cardiovascular:      Rate and Rhythm: Normal rate and regular rhythm  Heart sounds: No murmur heard  Pulmonary:      Effort: Pulmonary effort is normal  No respiratory distress  Breath sounds: Normal breath sounds  Abdominal:      Palpations: Abdomen is soft  Tenderness: There is no abdominal tenderness  Musculoskeletal:      Cervical back: Neck supple  Skin:     General: Skin is warm and dry  Neurological:      Mental Status: She is alert            Mynor Hwang MD  [address] [place] [address] INTERNAL MEDICINE

## 2022-04-08 NOTE — PATIENT INSTRUCTIONS

## 2022-04-11 NOTE — TELEPHONE ENCOUNTER
Per Hannah Kong MA Patient spoke with Dr Sam Willson Last Friday regarding vaccine and pill for travel   Please see OV note thank you

## 2022-04-12 ENCOUNTER — TELEPHONE (OUTPATIENT)
Dept: INTERNAL MEDICINE CLINIC | Facility: CLINIC | Age: 42
End: 2022-04-12

## 2022-04-12 DIAGNOSIS — Z23 NEED FOR PROPHYLACTIC VACCINATION WITH TYPHOID-PARATYPHOID ALONE (TAB): Primary | ICD-10-CM

## 2022-04-12 NOTE — TELEPHONE ENCOUNTER
Patient called and stated Yonis hunter has been working to get typhoid vaccine , she stated they have one at [facility] ready to give at  [address] [place] [address] Lindsay hunter it is a injectable one called tommie she just needs a script called into pharmacy to go get the injectable typhoid shot done

## 2022-04-12 NOTE — TELEPHONE ENCOUNTER
Per pharmacist, Yamil Hathaway, pt is ok for the pt to come in and get the Typhoid vaccine under the care of the pharmacy's standing order doctor  No prescription needed  LMOM for pt

## 2022-04-20 ENCOUNTER — TELEPHONE (OUTPATIENT)
Dept: OBGYN CLINIC | Facility: CLINIC | Age: 42
End: 2022-04-20

## 2022-04-29 ENCOUNTER — HOSPITAL ENCOUNTER (OUTPATIENT)
Dept: RADIOLOGY | Facility: HOSPITAL | Age: 42
Discharge: HOME | End: 2022-04-29
Payer: COMMERCIAL

## 2022-04-29 DIAGNOSIS — R92.8 ABNORMAL MAMMOGRAM: ICD-10-CM

## 2022-04-29 PROCEDURE — 77065 DX MAMMO INCL CAD UNI: CPT

## 2022-04-29 PROCEDURE — 76642 ULTRASOUND BREAST LIMITED: CPT

## 2022-04-29 PROCEDURE — G0279 TOMOSYNTHESIS, MAMMO: HCPCS

## 2022-05-10 ENCOUNTER — ANNUAL EXAM (OUTPATIENT)
Dept: OBGYN CLINIC | Facility: CLINIC | Age: 42
End: 2022-05-10
Payer: COMMERCIAL

## 2022-05-10 VITALS
DIASTOLIC BLOOD PRESSURE: 68 MMHG | HEIGHT: 65 IN | BODY MASS INDEX: 27.16 KG/M2 | SYSTOLIC BLOOD PRESSURE: 120 MMHG | WEIGHT: 163 LBS

## 2022-05-10 DIAGNOSIS — Z01.419 ENCNTR FOR GYN EXAM (GENERAL) (ROUTINE) W/O ABN FINDINGS: ICD-10-CM

## 2022-05-10 DIAGNOSIS — Z01.419 ENCOUNTER FOR GYNECOLOGICAL EXAMINATION WITHOUT ABNORMAL FINDING: ICD-10-CM

## 2022-05-10 DIAGNOSIS — Z12.31 ENCOUNTER FOR SCREENING MAMMOGRAM FOR MALIGNANT NEOPLASM OF BREAST: ICD-10-CM

## 2022-05-10 PROBLEM — Z00.00 ANNUAL PHYSICAL EXAM: Status: RESOLVED | Noted: 2022-04-08 | Resolved: 2022-05-10

## 2022-05-10 PROBLEM — R07.81 RIB PAIN ON RIGHT SIDE: Status: RESOLVED | Noted: 2019-03-28 | Resolved: 2022-05-10

## 2022-05-10 PROBLEM — S20.221A: Status: RESOLVED | Noted: 2019-03-28 | Resolved: 2022-05-10

## 2022-05-10 PROCEDURE — S0612 ANNUAL GYNECOLOGICAL EXAMINA: HCPCS | Performed by: PHYSICIAN ASSISTANT

## 2022-05-10 PROCEDURE — 3008F BODY MASS INDEX DOCD: CPT | Performed by: INTERNAL MEDICINE

## 2022-05-10 RX ORDER — NORETHINDRONE ACETATE AND ETHINYL ESTRADIOL, ETHINYL ESTRADIOL AND FERROUS FUMARATE 1MG-10(24)
1 KIT ORAL DAILY
Qty: 84 TABLET | Refills: 4 | Status: SHIPPED | OUTPATIENT
Start: 2022-05-10

## 2022-05-10 NOTE — PROGRESS NOTES
Josh Horton  1980      CC:  Yearly exam    S:  38 y o  female here for yearly exam      Sexual activity: She is sexually active with her fiancee without pain, bleeding or dryness  Contraception: She uses Lo Loestrin for contraception  She is mostly amenorrheic on this  Pap 2/24/21 neg/neg  Pap 1/31/20 neg/neg  Pap 6/18/19 ASCUS +HPV  Colpo 1/8/19 benign  Pap 12/12/18 ASCUS +HPV       Last Mammo 3/24/22 - asymmetry  Dx studies 4/29/22 - dense band of tissue  Rec  Repeat one year  We reviewed ASCCP guidelines for Pap testing today  Family hx of breast cancer: no  Family hx of ovarian cancer: no  Family hx of colon cancer: no      Current Outpatient Medications:     Lo Loestrin Fe 1 MG-10 MCG / 10 MCG TABS, TAKE 1 TABLET BY MOUTH EVERY DAY, Disp: 84 tablet, Rfl: 0  Social History     Socioeconomic History    Marital status:       Spouse name: Not on file    Number of children: Not on file    Years of education: Not on file    Highest education level: Not on file   Occupational History    Occupation:    Tobacco Use    Smoking status: Former Smoker    Smokeless tobacco: Never Used   Vaping Use    Vaping Use: Never used   Substance and Sexual Activity    Alcohol use: Yes     Comment: social    Drug use: No    Sexual activity: Yes     Partners: Male     Birth control/protection: OCP   Other Topics Concern    Not on file   Social History Narrative    Completed college     Social Determinants of Health     Financial Resource Strain: Not on file   Food Insecurity: Not on file   Transportation Needs: Not on file   Physical Activity: Not on file   Stress: Not on file   Social Connections: Not on file   Intimate Partner Violence: Not on file   Housing Stability: Not on file     Family History   Problem Relation Age of Onset    No Known Problems Mother     No Known Problems Father     Heart attack Maternal Grandmother     Other Maternal Grandfather mesothelioma    Heart attack Paternal Grandfather     Diabetes Family     No Known Problems Sister     No Known Problems Brother     No Known Problems Maternal Aunt     No Known Problems Paternal Aunt       Past Medical History:   Diagnosis Date    Abnormal Pap smear of cervix     [address]5 [address] fracture ([facility] [address] )     Last Assessed:2/1/2016    Cyst of ovary, right 11/07/2007    HPV (human papilloma virus) infection         Review of Systems   Respiratory: Negative  Cardiovascular: Negative  Gastrointestinal: Negative for constipation and diarrhea  Genitourinary: Negative for difficulty urinating, pelvic pain, vaginal bleeding, vaginal discharge, itching or odor  O:  Blood pressure 120/68, height 5' 5 35" (1 66 m), weight 73 9 kg (163 lb), last menstrual period 05/10/2022  Patient appears well and is not in distress  Neck is supple without masses  Breasts are symmetrical without mass, tenderness, nipple discharge, skin changes or adenopathy  Abdomen is soft and nontender without masses  External genitals are normal without lesions or rashes  Urethral meatus and urethra are normal  Bladder is normal to palpation  Vagina is normal without discharge or bleeding  Cervix is normal without discharge or lesion  Uterus is normal, mobile, nontender without palpable mass  Adnexa are normal, nontender, without palpable mass  A:  Yearly exam      P:   Pap 2024    Mammo slip provided    Lo Loestrin sent to pharmacy     RTO one year for yearly exam or sooner as needed

## 2022-09-09 ENCOUNTER — TELEPHONE (OUTPATIENT)
Dept: OBGYN CLINIC | Facility: CLINIC | Age: 42
End: 2022-09-09

## 2022-09-09 ENCOUNTER — CLINICAL SUPPORT (OUTPATIENT)
Dept: INTERNAL MEDICINE CLINIC | Facility: CLINIC | Age: 42
End: 2022-09-09
Payer: COMMERCIAL

## 2022-09-09 DIAGNOSIS — Z30.9 ENCOUNTER FOR CONTRACEPTIVE MANAGEMENT, UNSPECIFIED TYPE: Primary | ICD-10-CM

## 2022-09-09 DIAGNOSIS — Z23 ENCOUNTER FOR IMMUNIZATION: Primary | ICD-10-CM

## 2022-09-09 PROCEDURE — 90471 IMMUNIZATION ADMIN: CPT

## 2022-09-09 PROCEDURE — 90632 HEPA VACCINE ADULT IM: CPT

## 2022-09-09 RX ORDER — NORETHINDRONE ACETATE AND ETHINYL ESTRADIOL, ETHINYL ESTRADIOL AND FERROUS FUMARATE 1MG-10(24)
KIT ORAL
Qty: 84 TABLET | Refills: 3 | Status: SHIPPED | OUTPATIENT
Start: 2022-09-09 | End: 2023-05-16 | Stop reason: SDUPTHER

## 2022-09-09 NOTE — TELEPHONE ENCOUNTER
Pt asked for her bc to be resent to pharm, the pharm said it was inactive and wouldn't refill it.     She asked for it to be sent to...    [facility] [address] [place] [address] [place], [place] [id number]

## 2022-09-28 ENCOUNTER — OFFICE VISIT (OUTPATIENT)
Dept: INTERNAL MEDICINE CLINIC | Facility: CLINIC | Age: 42
End: 2022-09-28
Payer: COMMERCIAL

## 2022-09-28 VITALS
HEART RATE: 71 BPM | WEIGHT: 169 LBS | DIASTOLIC BLOOD PRESSURE: 70 MMHG | SYSTOLIC BLOOD PRESSURE: 112 MMHG | TEMPERATURE: 98.3 F | OXYGEN SATURATION: 99 % | HEIGHT: 65 IN | BODY MASS INDEX: 28.16 KG/M2

## 2022-09-28 DIAGNOSIS — R53.83 OTHER FATIGUE: ICD-10-CM

## 2022-09-28 DIAGNOSIS — R22.1 NECK SWELLING: Primary | ICD-10-CM

## 2022-09-28 PROCEDURE — 99213 OFFICE O/P EST LOW 20 MIN: CPT | Performed by: INTERNAL MEDICINE

## 2022-09-28 PROCEDURE — 3725F SCREEN DEPRESSION PERFORMED: CPT | Performed by: INTERNAL MEDICINE

## 2022-09-28 NOTE — PROGRESS NOTES
Assessment/Plan:      Depression Screening and Follow-up Plan: Patient was screened for depression during today's encounter  They screened negative with a PHQ-2 score of 0             1  Neck swelling  -     TSH, 3rd generation; Future    2  Other fatigue  -     TSH, 3rd generation; Future         Subjective:      Patient ID: Sharmila Jaime is a 38 y o  female  Was told by dentist to have her thyroid checked out, dentist thought she has a swelling in the neck      The following portions of the patient's history were reviewed and updated as appropriate: She  has a past medical history of Abnormal Pap smear of cervix, C5 vertebral fracture ([address] ), Cyst of ovary, right (11/07/2007), and HPV (human papilloma virus) infection  She   Patient Active Problem List    Diagnosis Date Noted    Neck swelling 09/28/2022    Other fatigue 09/28/2022     She  has a past surgical history that includes Neck surgery (08/05/2015) and Colposcopy (01/08/2019)  Her family history includes Diabetes in her family; Heart attack in her maternal grandmother and paternal grandfather; No Known Problems in her brother, father, maternal aunt, mother, paternal aunt, and sister; Other in her maternal grandfather  She  reports that she has quit smoking  She has never used smokeless tobacco  She reports current alcohol use  She reports that she does not use drugs  Current Outpatient Medications   Medication Sig Dispense Refill    Norethin-Eth Estrad-Fe Biphas (Lo Loestrin Fe) 1 MG-10 MCG / 10 MCG TABS Take 1 tablet daily 84 tablet 3     No current facility-administered medications for this visit       Current Outpatient Medications on File Prior to Visit   Medication Sig    Norethin-Eth Estrad-Fe Biphas (Lo Loestrin Fe) 1 MG-10 MCG / 10 MCG TABS Take 1 tablet daily    [DISCONTINUED] Norethin-Eth Estrad-Fe Biphas (Lo Loestrin Fe) 1 MG-10 MCG / 10 MCG TABS Take 1 tablet by mouth daily (Patient not taking: Reported on 9/28/2022)     No current facility-administered medications on file prior to visit  She has No Known Allergies       Review of Systems   Constitutional: Negative for chills and fever  HENT: Negative for congestion, ear pain and sore throat  Eyes: Negative for pain  Respiratory: Negative for cough and shortness of breath  Cardiovascular: Negative for chest pain and leg swelling  Gastrointestinal: Negative for abdominal pain, nausea and vomiting  Endocrine: Negative for polyuria  Genitourinary: Negative for difficulty urinating, frequency and urgency  Musculoskeletal: Negative for arthralgias and back pain  Skin: Negative for rash  Neurological: Negative for weakness and headaches  Psychiatric/Behavioral: Negative for sleep disturbance  The patient is not nervous/anxious  Objective:      /70 (BP Location: Left arm, Patient Position: Sitting, Cuff Size: Adult)   Pulse 71   Temp 98 3 °F (36 8 °C) (Temporal)   Ht 5' 5" (1 651 m)   Wt 76 7 kg (169 lb)   SpO2 99%   BMI 28 12 kg/m²     No results found for this or any previous visit (from the past 1344 hour(s))  Physical Exam  Constitutional:       Appearance: Normal appearance  HENT:      Head: Normocephalic  Right Ear: Tympanic membrane, ear canal and external ear normal       Left Ear: Tympanic membrane, ear canal and external ear normal       Nose: Nose normal  No congestion  Mouth/Throat:      Mouth: Mucous membranes are moist       Pharynx: Oropharynx is clear  No oropharyngeal exudate or posterior oropharyngeal erythema  Eyes:      Extraocular Movements: Extraocular movements intact  Conjunctiva/sclera: Conjunctivae normal       Pupils: Pupils are equal, round, and reactive to light  Neck:      Comments: No thyroid enlargement  Cardiovascular:      Rate and Rhythm: Normal rate and regular rhythm  Heart sounds: Normal heart sounds  No murmur heard    Pulmonary:      Effort: Pulmonary effort is normal       Breath sounds: Normal breath sounds  No wheezing or rales  Musculoskeletal:         General: Normal range of motion  Cervical back: Normal range of motion and neck supple  Right lower leg: No edema  Left lower leg: No edema  Lymphadenopathy:      Cervical: No cervical adenopathy  Skin:     General: Skin is warm  Neurological:      General: No focal deficit present  Mental Status: She is alert and oriented to person, place, and time

## 2023-02-28 ENCOUNTER — OFFICE VISIT (OUTPATIENT)
Dept: URGENT CARE | Age: 43
End: 2023-02-28

## 2023-02-28 VITALS
DIASTOLIC BLOOD PRESSURE: 68 MMHG | SYSTOLIC BLOOD PRESSURE: 135 MMHG | OXYGEN SATURATION: 99 % | RESPIRATION RATE: 16 BRPM | HEART RATE: 102 BPM | TEMPERATURE: 97.1 F

## 2023-02-28 DIAGNOSIS — L98.9 LESION OF SKIN OF FOOT: Primary | ICD-10-CM

## 2023-02-28 NOTE — PROGRESS NOTES
[facility]        NAME: Leilani Milan is a 41 y o  female  : 1980    MRN: [id number]  DATE: 2023  TIME: 9:54 AM    Assessment and Plan   Lesion of skin of foot [L98 9]  1  Lesion of skin of foot        Area of redness outlined with skin marker, advised to monitor area for worsening redness/ swelling  Continue to monitor area for worsening redness, swelling, itching  May apply small amount of over-the-counter cortisone cream for itching  If you notice worsening redness, swelling, fever or drainage, report immediately for further evaluation  Patient Instructions   Insect Bite or Sting   WHAT YOU NEED TO KNOW:   Most insect bites and stings are not dangerous and go away without treatment  Your symptoms may be mild, or you may develop anaphylaxis  Anaphylaxis is a sudden, life-threatening reaction that needs immediate treatment  Common examples of insects that bite or sting are bees, ticks, mosquitoes, spiders, and ants  Insect bites or stings can lead to diseases such as malaria, West Nile virus, Lyme disease, or [place] Spotted Fever  DISCHARGE INSTRUCTIONS:   Call your local emergency number (911 in the [address] [place] [address],[address]) for signs or symptoms of anaphylaxis,  such as trouble breathing, swelling in your mouth or throat, or wheezing  You may also have itching, a rash, hives, or feel like you are going to faint  Return to the emergency department if:   • You are stung on your tongue or in your throat      • A white area forms around the bite      • You are sweating badly or have body pain      • You think you were bitten or stung by a poisonous insect      Call your doctor if:   • You have a fever      • The area becomes red, warm, tender, and swollen beyond the area of the bite or sting      • You have questions or concerns about your condition or care      Medicines:   You may need any of the following:  • Antihistamines  decrease itching and rash      • Epinephrine  is used to treat severe allergic reactions such as anaphylaxis      • Take your medicine as directed  Contact your healthcare provider if you think your medicine is not helping or if you have side effects  Tell your provider if you are allergic to any medicine  Keep a list of the medicines, vitamins, and herbs you take  Include the amounts, and when and why you take them  Bring the list or the pill bottles to follow-up visits  Carry your medicine list with you in case of an emergency      Steps to take for signs or symptoms of anaphylaxis:   • Immediately  give 1 shot of epinephrine only into the outer thigh muscle           • Leave the shot in place  as directed  Your healthcare provider may recommend you leave it in place for up to 10 seconds before you remove it  This helps make sure all of the epinephrine is delivered      • Call 911 and go to the emergency department,  even if the shot improved symptoms  Do not drive yourself  Bring the used epinephrine shot with you      Safety precautions to take if you are at risk for anaphylaxis:   • Keep 2 shots of epinephrine with you at all times  You may need a second shot, because epinephrine only works for about 20 minutes and symptoms may return  Your healthcare provider can show you and family members how to give the shot  Check the expiration date every month and replace it before it expires      • Create an action plan  Your healthcare provider can help you create a written plan that explains the allergy and an emergency plan to treat a reaction  The plan explains when to give a second epinephrine shot if symptoms return or do not improve after the first  Give copies of the action plan and emergency instructions to family members, work and school staff, and  providers  Show them how to give a shot of epinephrine      • Carry medical alert identification  Wear medical alert jewelry or carry a card that says you have an insect allergy   Ask your healthcare provider where to get these items         If an insect bites or stings you:   • Remove the stinger  Scrape the stinger out with your fingernail, edge of a credit card, or a knife blade  Do not squeeze the wound  Gently wash the area with soap and water      • Remove the tick  Ticks must be removed as soon as possible so you do not get diseases passed through tick bites  Ask your healthcare provider for more information on tick bites and how to remove ticks      Care for a bite or sting wound:   • Elevate (raise) the area above the level of your heart, if possible  Prop the area on pillows to keep it raised comfortably  Elevate the area for 10 to 20 minutes each hour or as directed by your healthcare provider           • Use compresses  Soak a clean washcloth in cold water, wring it out, and put it on the bite or sting  Use the compress for 10 to 20 minutes each hour or as directed by your healthcare provider  After 24 to 48 hours, change to warm compresses      • Apply a paste  Add water to baking soda to make a thick paste  Put the paste on the area for 5 minutes  Rinse gently to remove the paste      Prevent another insect bite or sting:   • Do not wear bright-colored or flower-print clothing when you plan to spend time outdoors  Do not use hairspray, perfumes, or aftershave      • Do not leave food out      • Empty any standing water and wash container with soap and water every 2 days      • Put screens on all open windows and doors      • Put insect repellent that contains DEET on skin that is showing when you go outside  Put insect repellent at the top of your boots, bottom of pant legs, and sleeve cuffs  Wear long sleeves, pants, and shoes      • Use citronella candles outdoors to help keep mosquitoes away  Put a tick and flea collar on pets      Follow up with your doctor as directed:  Write down your questions so you remember to ask them during your visits    © Copyright [employer] 2022 Information is for End User's use only and may not be sold, redistributed or otherwise used for commercial purposes  The above information is an  only  It is not intended as medical advice for individual conditions or treatments  Talk to your doctor, nurse or pharmacist before following any medical regimen to see if it is safe and effective for you      Wound Infection   WHAT YOU NEED TO KNOW:   A wound infection occurs when bacteria enters a break in the skin  The infection may involve just the skin, or affect deeper tissues or organs close to the wound  DISCHARGE INSTRUCTIONS:   Return to the emergency department if:   • You feel short of breath       • Your heart is beating faster than usual       • You feel confused       • Blood soaks through your bandages      • Your wound comes apart or feels like it is ripping       • You have severe pain      • You see red streaks coming from the infected area      Contact your healthcare provider if:   • You have a fever or chills       • You have more pain, redness, or swelling near your wound      • Your symptoms do not improve       • The skin around your wound feels numb      • You have questions or concerns about your condition or care      Medicines: You may need any of the following:  • NSAIDs , such as ibuprofen, help decrease swelling, pain, and fever  This medicine is available with or without a doctor's order  NSAIDs can cause stomach bleeding or kidney problems in certain people  If you take blood thinner medicine, always ask your healthcare provider if NSAIDs are safe for you  Always read the medicine label and follow directions      • Antibiotics  help treat a bacterial infection       • Take your medicine as directed  Contact your healthcare provider if you think your medicine is not helping or if you have side effects  Tell your provider if you are allergic to any medicine  Keep a list of the medicines, vitamins, and herbs you take   Include the amounts, and when and why you take them  Bring the list or the pill bottles to follow-up visits  Carry your medicine list with you in case of an emergency      Care for your wound as directed:  Keep your wound clean and dry  You may need to cover your wound when you bathe so it does not get wet  Clean your wound as directed with soap and water or wound   Put on new, clean bandages as directed  Change your bandages when they get wet or dirty  Help your wound heal:   • Eat a variety of healthy foods  Examples include fruits, vegetables, whole-grain breads, low-fat dairy products, beans, lean meats, and fish  Healthy foods may help you heal faster  You may also need to take vitamins and minerals  Ask if you need to be on a special diet      • Manage other health conditions  Follow your provider's directions to manage health conditions that can cause slow wound healing  Examples include high blood pressure and diabetes      • Do not smoke  Nicotine and other chemicals in cigarettes and cigars can cause slow wound healing  Ask your provider for information if you currently smoke and need help to quit  E-cigarettes or smokeless tobacco still contain nicotine  Talk to your provider before you use these products      Follow up with your healthcare provider in 1 to 2 days:  Write down your questions so you remember to ask them during your visits  © Copyright [facility] 2022 Information is for End User's use only and may not be sold, redistributed or otherwise used for commercial purposes  The above information is an  only  It is not intended as medical advice for individual conditions or treatments  Talk to your doctor, nurse or pharmacist before following any medical regimen to see if it is safe and effective for you  Follow up with PCP in 3-5 days  Proceed to  ER if symptoms worsen      Chief Complaint     Chief Complaint   Patient presents with   • Insect Bite     Possible insect bite, 1 cm in diameter, bruised, swollen, no puncture marks noted, no drainage, since this morning,          History of Present Illness       Patient is a 79-year-old female with no significant past medical history who presents for evaluation of small area of erythema associated with itching located on the dorsum of her right foot  She first noticed it this morning  She is concerned that she may have sustained a bug bite  She denies pain, numbness, tingling, drainage, direct trauma or injury  She reports that she has been using a pumice stone but that has been on the sole of her foot  Insect Bite  Pertinent negatives include no arthralgias, chest pain, congestion, coughing, fatigue, fever, headaches, myalgias, nausea, neck pain, numbness, rash, sore throat or vomiting  Review of Systems   Review of Systems   Constitutional: Negative for fatigue and fever  HENT: Negative for congestion, ear discharge, ear pain, postnasal drip, rhinorrhea, sinus pressure, sinus pain, sneezing and sore throat  Eyes: Negative  Negative for pain, discharge, redness and itching  Respiratory: Negative  Negative for apnea, cough, choking, chest tightness, shortness of breath and stridor  Cardiovascular: Negative  Negative for chest pain and palpitations  Gastrointestinal: Negative  Negative for diarrhea, nausea and vomiting  Endocrine: Negative  Negative for polydipsia, polyphagia and polyuria  Genitourinary: Negative  Negative for decreased urine volume and flank pain  Musculoskeletal: Negative  Negative for arthralgias, back pain, myalgias, neck pain and neck stiffness  Skin: Positive for color change  Negative for rash  Pruritis     Allergic/Immunologic: Negative  Negative for environmental allergies  Neurological: Negative  Negative for dizziness, facial asymmetry, light-headedness, numbness and headaches  Hematological: Negative  Negative for adenopathy  Psychiatric/Behavioral: Negative            Current Medications       Current Outpatient Medications:   •  Norethin-Eth Estrad-Fe Biphas (Lo Loestrin Fe) 1 MG-10 MCG / 10 MCG TABS, Take 1 tablet daily, Disp: 84 tablet, Rfl: 3    Current Allergies     Allergies as of 02/28/2023   • (No Known Allergies)            The following portions of the patient's history were reviewed and updated as appropriate: allergies, current medications, past family history, past medical history, past social history, past surgical history and problem list      Past Medical History:   Diagnosis Date   • Abnormal Pap smear of cervix    • C5 vertebral fracture ([facility] [address] )     Last Assessed:2/1/2016   • Cyst of ovary, right 11/07/2007   • HPV (human papilloma virus) infection        Past Surgical History:   Procedure Laterality Date   • COLPOSCOPY  01/08/2019    neg   • NECK SURGERY  08/05/2015    1  Anterior C5 corpectomy with greater than 80% of the verebral body removed2  C4-C5,C5-C6(2 level)ianterior cervical discectomy3  Placement of -C6(2 level) interbody PEEK cage for 2 level interbody fusion4  C4-C6(2 level) anterior titanium plate and screw instrumentation for anterior cervical fusion5  Use of microscopy for microdissection/corpectomy6  Harvesting of autologous bone for                         Family History   Problem Relation Age of Onset   • No Known Problems Mother    • No Known Problems Father    • Heart attack Maternal Grandmother    • Other Maternal Grandfather         mesothelioma   • Heart attack Paternal Grandfather    • Diabetes Family    • No Known Problems Sister    • No Known Problems Brother    • No Known Problems Maternal Aunt    • No Known Problems Paternal Aunt          Medications have been verified  Objective   /68   Pulse 102   Temp (!) 97 1 °F (36 2 °C)   Resp 16   SpO2 99%        Physical Exam     Physical Exam  Vitals and nursing note reviewed  Constitutional:       General: She is not in acute distress  Appearance: Normal appearance   She is not ill-appearing, toxic-appearing or diaphoretic  HENT:      Head: Normocephalic and atraumatic  Right Ear: External ear normal       Left Ear: External ear normal       Nose: Nose normal  No congestion or rhinorrhea  Mouth/Throat:      Mouth: Mucous membranes are moist    Eyes:      Extraocular Movements: Extraocular movements intact  Conjunctiva/sclera: Conjunctivae normal       Pupils: Pupils are equal, round, and reactive to light  Cardiovascular:      Rate and Rhythm: Normal rate and regular rhythm  Pulses: Normal pulses  Heart sounds: Normal heart sounds  No murmur heard  No friction rub  No gallop  Pulmonary:      Effort: Pulmonary effort is normal  No respiratory distress  Breath sounds: Normal breath sounds  No stridor  No wheezing, rhonchi or rales  Abdominal:      General: Bowel sounds are normal       Palpations: Abdomen is soft  Tenderness: There is no abdominal tenderness  There is no guarding or rebound  Musculoskeletal:         General: Normal range of motion  Cervical back: Normal range of motion and neck supple  No rigidity or tenderness  Left foot: Normal range of motion  No deformity, bunion, Charcot foot, foot drop or prominent metatarsal heads  Feet:    Feet:      Left foot:      Skin integrity: Erythema present  No ulcer, blister, skin breakdown, warmth, callus, dry skin or fissure  Toenail Condition: Left toenails are normal       Comments: Approximately 1 cm x 1 cm area of erythema located on dorsum of right foot, minimal swelling, no appreciable drainage  Lymphadenopathy:      Cervical: No cervical adenopathy  Skin:     General: Skin is warm and dry  Capillary Refill: Capillary refill takes less than 2 seconds  Neurological:      General: No focal deficit present  Mental Status: She is alert and oriented to person, place, and time  Cranial Nerves: No cranial nerve deficit     Psychiatric:         Mood and Affect: Mood normal          Behavior: Behavior normal

## 2023-02-28 NOTE — PATIENT INSTRUCTIONS
Area of redness outlined with skin marker, advised to monitor area for worsening redness/ swelling  Continue to monitor area for worsening redness, swelling, itching  May apply small amount of over-the-counter cortisone cream for itching  If you notice worsening redness, swelling, fever or drainage, report immediately for further evaluation

## 2023-05-16 DIAGNOSIS — Z30.9 ENCOUNTER FOR CONTRACEPTIVE MANAGEMENT, UNSPECIFIED TYPE: ICD-10-CM

## 2023-05-16 RX ORDER — NORETHINDRONE ACETATE AND ETHINYL ESTRADIOL, ETHINYL ESTRADIOL AND FERROUS FUMARATE 1MG-10(24)
KIT ORAL
Qty: 28 TABLET | Refills: 0 | Status: SHIPPED | OUTPATIENT
Start: 2023-05-16 | End: 2023-05-26 | Stop reason: ALTCHOICE

## 2023-05-26 ENCOUNTER — ANNUAL EXAM (OUTPATIENT)
Dept: OBGYN CLINIC | Facility: CLINIC | Age: 43
End: 2023-05-26

## 2023-05-26 VITALS
DIASTOLIC BLOOD PRESSURE: 80 MMHG | HEIGHT: 65 IN | BODY MASS INDEX: 29.16 KG/M2 | SYSTOLIC BLOOD PRESSURE: 130 MMHG | WEIGHT: 175 LBS

## 2023-05-26 DIAGNOSIS — Z12.31 ENCOUNTER FOR SCREENING MAMMOGRAM FOR MALIGNANT NEOPLASM OF BREAST: ICD-10-CM

## 2023-05-26 DIAGNOSIS — Z30.011 ENCOUNTER FOR PRESCRIPTION OF ORAL CONTRACEPTIVES: ICD-10-CM

## 2023-05-26 DIAGNOSIS — Z01.419 WELL WOMAN EXAM WITH ROUTINE GYNECOLOGICAL EXAM: Primary | ICD-10-CM

## 2023-05-26 RX ORDER — ACETAMINOPHEN AND CODEINE PHOSPHATE 120; 12 MG/5ML; MG/5ML
1 SOLUTION ORAL DAILY
Qty: 84 TABLET | Refills: 3 | Status: SHIPPED | OUTPATIENT
Start: 2023-05-26

## 2023-05-26 NOTE — PROGRESS NOTES
Patient presents for a routine annual visit  Last Pap Smear- 2021 neg/neg - abnormal in 2019  Pap due next year  LMP- 23 - irregular due to [facility]  Birth control- pill - needs refill   Mammogram- 2022   Referral given    Smoker - socially  Currently sexually active - one partner   Declines STD testing   No family history of uterine, ovarian, cervical or breast cancer   No concerns/questions for today's visit

## 2023-05-26 NOTE — PROGRESS NOTES
Assessment   41 y o  Georgina Smith presenting for annual exam      Plan   Diagnoses and all orders for this visit:    Well woman exam with routine gynecological exam    Encounter for screening mammogram for malignant neoplasm of breast  -     Mammo screening bilateral w 3d & cad; Future    Encounter for prescription of oral contraceptives  -     norethindrone (Tanisha) 0 35 MG tablet; Take 1 tablet (0 35 mg total) by mouth daily        Pap up to date  Mammo slip given    Contraception- taking Lo Loestrin  She is a social smoker  About 1-2 cigarettes a week  While she is not a daily smoker reviewed there is still elevated risk of clot/stroke/CV risk with estrogen use  Reviewed lower risk progesterone options  She is willing to try POP  rx sent  SBE encouraged, A yearly mammogram is recommended for breast cancer screening starting at age 36  ASCCP guidelines reviewed  Advised to call with any issues, all concerns & questions addressed  See provided information in your after visit summary     F/U Annually and PRN    Results will be released to [employer], if abnormal will call or message to review and discuss treatment plan      __________________________________________________________________    Subjective     Muriel Arndt is a 41 y o  Georgina Smith presenting for annual exam      Lo Loestrin for contraception- upon review of chart she is a social smoker  Has 1-2 cigarettes on the weekend  SCREENING  Last Pap: 2/24/21 neg/neg  Last Mammo: 03/24/2022 birads 1/0; follow up diagnostic mammo of right breast birads 1- resume routine screening    Pap Hx   Pap 2/24/21 neg/neg  Pap 1/31/20 neg/neg  Pap 6/18/19 ASCUS +HPV  Colpo 1/8/19 benign  Pap 12/12/18 ASCUS +HPV      GYN  Periods are light when present on lo loestrin    Sexually active: Yes - single partner - male  Contraception: OCP    Hx Abnormal pap: see above  We reviewed ASCCP guidelines for Pap testing today      Denies vaginal discharge, itching, odor, dyspareunia, pelvic pain and vulvar/vaginal symptoms      OB           Complaints: denies   Denies urgency, frequency, hematuria, leakage / change in stream, difficulty urinating  BREAST  Complaints: denies  Denies: breast lump, breast tenderness, nipple discharge, skin color change, and skin lesion(s)      Pertinent Family Hx:   Family hx of breast cancer: no  Family hx of ovarian cancer: no  Family hx of colon cancer: no      GENERAL  PMH reviewed/updated and is as below  Patient does follow with a PCP  SOCIAL  Smoking: social; 1-2 cigarettes on the weekend  Alcohol:social   Drug: no      Past Medical History:   Diagnosis Date   • Abnormal Pap smear of cervix    • C5 vertebral fracture (HCC)     Last Assessed:2016   • Cyst of ovary, right 2007   • HPV (human papilloma virus) infection        Past Surgical History:   Procedure Laterality Date   • COLPOSCOPY  2019    neg   • NECK SURGERY  2015    1  Anterior C5 corpectomy with greater than 80% of the verebral body removed2  C4-C5,C5-C6(2 level)ianterior cervical discectomy3  Placement of -C6(2 level) interbody PEEK cage for 2 level interbody fusion4  C4-C6(2 level) anterior titanium plate and screw instrumentation for anterior cervical fusion5  Use of microscopy for microdissection/corpectomy6  Harvesting of autologous bone for                           Current Outpatient Medications:   •  Norethin-Eth Estrad-Fe Biphas (Lo Loestrin Fe) 1 MG-10 MCG / 10 MCG TABS, Take 1 tablet daily, Disp: 28 tablet, Rfl: 0    No Known Allergies    Social History     Socioeconomic History   • Marital status: /Civil Union     Spouse name: Not on file   • Number of children: Not on file   • Years of education: Not on file   • Highest education level: Not on file   Occupational History   • Occupation:    Tobacco Use   • Smoking status: Some Days     Types: Cigarettes   • Smokeless tobacco: Never   • Tobacco comments:     socially Vaping Use   • Vaping Use: Never used   Substance and Sexual Activity   • Alcohol use: Yes     Comment: social   • Drug use: No   • Sexual activity: Yes     Partners: Male     Birth control/protection: OCP   Other Topics Concern   • Not on file   Social History Narrative    Completed college     Social Determinants of Health     Financial Resource Strain: Not on file   Food Insecurity: Not on file   Transportation Needs: Not on file   Physical Activity: Not on file   Stress: Not on file   Social Connections: Not on file   Intimate Partner Violence: Not on file   Housing Stability: Not on file       Review of Systems     ROS:  Constitutional: Negative for fatigue and unexpected weight change  Respiratory: Negative for cough and shortness of breath  Cardiovascular: Negative for chest pain and palpitations  Gastrointestinal: Negative for abdominal pain and change in bowel habits  Breasts:  Negative, other than as noted above  Genitourinary: Negative, other than as noted above  Psychiatric: Negative for mood difficulties  Objective         Vitals:    05/26/23 0830   BP: 130/80       Physical Examination:    Patient appears well and is not in distress  Neck is supple without masses, no cervical or supraclavicular lymphadenopathy  Cardiovascular: regular rate and rhythm; no murmurs  Lungs: clear to auscultation bilaterally; no wheezes  Breasts are symmetrical without mass, tenderness, nipple discharge, skin changes or adenopathy  Abdomen is soft and nontender without masses  External genitals are normal without lesions or rashes  Urethral meatus and urethra are normal  Bladder is normal to palpation  Vagina is normal without discharge or bleeding  Cervix is normal without discharge or lesion  Uterus is normal, mobile, nontender without palpable mass  Adnexa are normal, nontender, without palpable mass

## 2024-03-15 ENCOUNTER — APPOINTMENT (OUTPATIENT)
Dept: LAB | Facility: CLINIC | Age: 44
End: 2024-03-15
Payer: COMMERCIAL

## 2024-03-15 ENCOUNTER — TELEPHONE (OUTPATIENT)
Age: 44
End: 2024-03-15

## 2024-03-15 DIAGNOSIS — Z32.01 POSITIVE PREGNANCY TEST: ICD-10-CM

## 2024-03-15 DIAGNOSIS — Z32.01 POSITIVE PREGNANCY TEST: Primary | ICD-10-CM

## 2024-03-15 LAB — B-HCG SERPL-ACNC: ABNORMAL MIU/ML (ref 0–5)

## 2024-03-15 PROCEDURE — 84702 CHORIONIC GONADOTROPIN TEST: CPT

## 2024-03-15 PROCEDURE — 36415 COLL VENOUS BLD VENIPUNCTURE: CPT

## 2024-03-15 NOTE — TELEPHONE ENCOUNTER
Pt called stating she tested positive for pregnancy. Is concerned because she is over 40yrs old and is on birth control so she wants to be sure this is not a false positive before she sched Data and [facility] testing. Pt is requesting for OB to please place blood work orders to get tested for pregnancy to see if the home test is accurate. Please call or Mychart message pt if this is able to be done and when orders are placed.

## 2024-03-26 ENCOUNTER — INITIAL PRENATAL (OUTPATIENT)
Dept: OBGYN CLINIC | Facility: MEDICAL CENTER | Age: 44
End: 2024-03-26
Payer: COMMERCIAL

## 2024-03-26 VITALS — DIASTOLIC BLOOD PRESSURE: 78 MMHG | BODY MASS INDEX: 28.82 KG/M2 | SYSTOLIC BLOOD PRESSURE: 110 MMHG | WEIGHT: 173.2 LBS

## 2024-03-26 DIAGNOSIS — N91.1 SECONDARY AMENORRHEA: Primary | ICD-10-CM

## 2024-03-26 DIAGNOSIS — N83.202 LEFT OVARIAN CYST: ICD-10-CM

## 2024-03-26 PROBLEM — R22.1 NECK SWELLING: Status: RESOLVED | Noted: 2022-09-28 | Resolved: 2024-03-26

## 2024-03-26 PROBLEM — R53.83 OTHER FATIGUE: Status: RESOLVED | Noted: 2022-09-28 | Resolved: 2024-03-26

## 2024-03-26 PROCEDURE — 99213 OFFICE O/P EST LOW 20 MIN: CPT | Performed by: PHYSICIAN ASSISTANT

## 2024-03-26 PROCEDURE — 76817 TRANSVAGINAL US OBSTETRIC: CPT | Performed by: PHYSICIAN ASSISTANT

## 2024-03-26 RX ORDER — GLYCERIN/MINERAL OIL
LOTION (ML) TOPICAL
COMMUNITY

## 2024-03-26 NOTE — PROGRESS NOTES
ASSESSMENT/PLAN    Early pregnancy at 7w6d with a calculated GIOVANI of 24 based on LMP and confirmed by today's ultrasound.     - Genetic screening options reviewed. She is interested in NIPT, so MFM referral placed  - Prenatal care reviewed  - Pregnancy precautions reviewed  - Prenatal Vitamin recommended.     RTO for OB interview and PN-1 visit    2. Left ovarian cyst  4.57 x 3 x 2.4 cm left ovarian cyst with hyperechoic debris. Suspect hemorrhagic CL cyst. Given slip for pelvic US through radiology for evaluation. Rupture/torsion precautions given.     SUBJECTIVE:    Uyen Ruvalcaba is a 44 y.o.  presenting today for verification of pregnancy.     Patient's last menstrual period was 2024.    Menses : stopped POP at end of December. Recalls withdrawal bleed, then LMP 2024 (exact date).   This pregnancy was unplanned but is desired by her and partner.  She is accompanied by her sister Lilliam.     Reports mild intermittent cramping and breast tenderness.   Denies bleeding, vomiting, nausea.     OB hx significant for:   Single IAB in her 20s - D&C     Taking a prenatal vitamin.     The following portions of the patient's history were reviewed and updated as appropriate: allergies, current medications, past family history, past medical history, past social history, past surgical history, and problem list.    OBJECTIVE:    /78 (BP Location: Left arm, Patient Position: Sitting, Cuff Size: Standard)   Wt 78.6 kg (173 lb 3.2 oz)   LMP 2024   BMI 28.82 kg/m²     FINDINGS:  See imaging report for details    Single intrauterine pregnancy of 7w6d gestational age.     Additional Findings: 4.57 x 3 x 2.4 cm left ovarian cyst with hyperechoic debris.     FHR: 145    Ultrasound Probe Disinfection    A transvaginal ultrasound was performed.   Prior to use, disinfection was performed with High Level Disinfection Process ([employer])  Probe serial number [id number]:  [id number] was used    Denice  Audrey Armenta PA-C  03/26/24  11:58 AM

## 2024-03-27 ENCOUNTER — TELEPHONE (OUTPATIENT)
Age: 44
End: 2024-03-27

## 2024-04-04 ENCOUNTER — NURSE TRIAGE (OUTPATIENT)
Age: 44
End: 2024-04-04

## 2024-04-04 ENCOUNTER — HOSPITAL ENCOUNTER (OUTPATIENT)
Dept: ULTRASOUND IMAGING | Facility: HOSPITAL | Age: 44
Discharge: HOME | End: 2024-04-04
Payer: COMMERCIAL

## 2024-04-04 DIAGNOSIS — N83.202 LEFT OVARIAN CYST: ICD-10-CM

## 2024-04-04 PROCEDURE — 76801 OB US < 14 WKS SINGLE FETUS: CPT

## 2024-04-04 NOTE — TELEPHONE ENCOUNTER
Reason for Disposition   Non-urgent call redirected to specialist's office because it is open    Answer Assessment - Initial Assessment Questions  Received call from Vicki in Radiology to report significant findings on OB u/S done today.  Forwarded to OB Navigator    Protocols used: No Contact or Duplicate Contact Call-ADULT-[place]

## 2024-04-05 DIAGNOSIS — Z34.01 ENCOUNTER FOR SUPERVISION OF NORMAL FIRST PREGNANCY IN FIRST TRIMESTER: Primary | ICD-10-CM

## 2024-04-11 ENCOUNTER — INITIAL PRENATAL (OUTPATIENT)
Dept: OBGYN CLINIC | Facility: CLINIC | Age: 44
End: 2024-04-11

## 2024-04-11 DIAGNOSIS — Z36.9 UNSPECIFIED ANTENATAL SCREENING: ICD-10-CM

## 2024-04-11 DIAGNOSIS — Z34.81 PRENATAL CARE, SUBSEQUENT PREGNANCY, FIRST TRIMESTER: Primary | ICD-10-CM

## 2024-04-11 DIAGNOSIS — Z31.430 ENCOUNTER OF FEMALE FOR TESTING FOR GENETIC DISEASE CARRIER STATUS FOR PROCREATIVE MANAGEMENT: ICD-10-CM

## 2024-04-11 PROCEDURE — OBC

## 2024-04-11 RX ORDER — OMEGA-3-ACID ETHYL ESTERS 1 G/1
2 CAPSULE, LIQUID FILLED ORAL 2 TIMES DAILY
COMMUNITY

## 2024-04-11 RX ORDER — DIPHENHYDRAMINE HCL 25 MG
25 CAPSULE ORAL EVERY 6 HOURS PRN
COMMUNITY

## 2024-04-11 NOTE — PROGRESS NOTES
OB INTAKE INTERVIEW  Patient is 44 y.o.y.o. who presents for OB intake at 10-6 wks  She is accompanied by self during this encounter  The father of her baby (Jose Miguel) is involved in the pregnancy and they are .      AB1  Last Menstrual Period: 24  Ultrasound: Measured 7 weeks 6 days on 3/26/24  Estimated Date of Delivery: 24 via LMP      Signs/Symptoms of Pregnancy  Current pregnancy symptoms: nausea, breast tenderness,  Constipation no  Headaches no  Cramping/spotting YES- occas.  PICA cravings no    Diabetes-    If patient has 1 or more, please order early 1 hour GTT  History of GDM no  BMI >35 no  History of PCOS or current metformin use no  History of LGA/macrosomic infant (4000g/9lbs) no    If patient has 2 or more, please order early 1 hour GTT  BMI>30 no  AMA YES  First degree relative with type 2 diabetes no  History of chronic HTN, hyperlipidemia, elevated A1C no  High risk race (, , ,  or ) no    Hypertension- if you answer yes to any of the following, please order baseline preeclampsia labs (cbc, comprehensive metabolic panel, urine protein creatinine ratio, uric acid)  History of of chronic HTN no  History of gestational HTN no  History of preeclampsia, eclampsia, or HELLP syndrome no  History of diabetes no  History of lupus, autoimmune disease, kidney disease no    Thyroid- if yes order TSH with reflex T4  History of thyroid disease no    Bleeding Disorder or Hx of DVT-patient or first degree relative with history of. Order the following if not done previously.   (Factor V, antithrombin III, prothrombin gene mutation, protein C and S Ag, lupus anticoagulant, anticardiolipin, beta-2 glycoprotein)   no    OB/GYN-  History of abnormal pap smear YES       Date of last pap smear 21 wnl  History of HPV YES  History of Herpes/HSV no  History of other STI (gonorrhea, chlamydia, trich) no  History of prior  no  History of prior   no  History of  delivery prior to 36 weeks 6 days no  History of blood transfusion no  Ok for blood transfusion yes    Substance screening- if yes outside of tobacco for her or anyone in her home-order urine drug screen  History of tobacco use YES  Currently using tobacco no  Currently using alcohol no  Presently using drugs no  Past drug use  YES- hx of cocaine use 20 yrs ago.  IV drug use- no  Partner drug use no  Parent/Family drug use no    MRSA Screening-   Does the pt have a hx of MRSA? no    Immunizations:  Influenza vaccine given this season no  Discussed Tdap vaccine yes  Discussed COVID Vaccine yes    Genetic/MFM-  Do you or your partner have a history of any of the following in yourselves or first degree relatives?  Cystic fibrosis no  Spinal muscular atrophy no  Hemoglobinopathy/Sickle Cell/Thalassemia no  Fragile X Intellectual Disability no    If yes, discuss Carrier Screening and recommend consultation with MFM/Genetic Counseling and place specific [facility] Referral for.    If no, discuss Carrier Screening being completed once in a lifetime as a standard of care lab test. Place orders for Cystic Fibrosis Gene Test (SKJ091) and Spinal Muscular Atrophy DNA (TCQ5100)      Appointment for Nuchal Translucency Ultrasound at [facility] scheduled for 24    Interview education  [facility] Pregnancy Essentials Book reviewed, discussed and attached to their AVS yes    Nurse/Family Partnership- patient may qualify no; referral placed no    Prenatal lab work scripts yes  Extra labs ordered:  CF, SMA, UDS    Aspirin/Preeclampsia Screen    Risk Level Risk Factor Recommendation   LOW Prior Uncomplicated full-term delivery no No Aspirin recommendation        MODERATE Nulliparity YES Recommend low-dose aspirin if     BMI>30 no 2 or more moderate risk factors    Family History Preeclampsia (mother/sister) no     35yr old or greater YES      or Low Socioeconomic no     IVF Pregnancy  no      Personal History Risks (low birth weight, prior adverse preg outcome, >10yr preg interval) no         HIGH History of Preeclampsia no Recommend low-dose aspirin if     Multifetal gestation no 1 or more high risk factors    Chronic HTN no     Type 1 or 2 Diabetes no     Renal Disease no     Autoimmune Disease  no      Contraindications to ASA therapy:  NSAID/ ASA allergy: no  Nasal polyps: no  Asthma with history of ASA induced bronchospasm: no  Relative contraindications:  History of GI bleed: no  Active peptic ulcer disease: no  Severe hepatic dysfunction: no    Patient should be recommended to take ASA 162mg during this pregnancy from 12-36wks to lower her risk of preeclampsia: yes- informed pt - pt is in agreement.      The patient has a history now or in prior pregnancy notable for: ,  AMA,  BMI 28.82,         Details that I feel the provider should be aware of: Pt requesting CF & SMA testing- ordered in epic.       PN1 visit scheduled. The patient was oriented to our practice, reviewed delivering physicians and [facility] for Delivery. All questions were answered. PN in-person interview completed.  Pt & , Jose Miguel, happy with pregnancy.  Pt conceived while taking OCP's, unplanned, but very welcomed!.  PN bldwk, including a CF & SMA , UDS, ordered in epic.  Advised pt to await authorization p.c. prior to having bldwk drawn.  Pt has appts scheduled for PNC , 24 & PN1, 24.  Advised pt to call with any further questions/concerns.       Interviewed by: Kay Pitts RN, CLC

## 2024-04-22 ENCOUNTER — ROUTINE PRENATAL (OUTPATIENT)
Facility: HOSPITAL | Age: 44
End: 2024-04-22
Payer: COMMERCIAL

## 2024-04-22 VITALS
DIASTOLIC BLOOD PRESSURE: 66 MMHG | HEIGHT: 65 IN | SYSTOLIC BLOOD PRESSURE: 104 MMHG | BODY MASS INDEX: 29.16 KG/M2 | WEIGHT: 175 LBS | HEART RATE: 91 BPM

## 2024-04-22 DIAGNOSIS — Z33.1 PREGNANT STATE, INCIDENTAL: ICD-10-CM

## 2024-04-22 DIAGNOSIS — F14.11 HISTORY OF COCAINE ABUSE (HCC): ICD-10-CM

## 2024-04-22 DIAGNOSIS — O09.521 ADVANCED MATERNAL AGE IN MULTIGRAVIDA, FIRST TRIMESTER: ICD-10-CM

## 2024-04-22 DIAGNOSIS — Z3A.12 12 WEEKS GESTATION OF PREGNANCY: ICD-10-CM

## 2024-04-22 DIAGNOSIS — Z13.79 GENETIC SCREENING: Primary | ICD-10-CM

## 2024-04-22 DIAGNOSIS — Z98.890 PERSONAL HISTORY OF SPINE SURGERY: ICD-10-CM

## 2024-04-22 DIAGNOSIS — Z36.82 NUCHAL TRANSLUCENCY OF FETUS ON PRENATAL ULTRASOUND: ICD-10-CM

## 2024-04-22 PROCEDURE — 99244 OFF/OP CNSLTJ NEW/EST MOD 40: CPT | Performed by: OBSTETRICS & GYNECOLOGY

## 2024-04-22 PROCEDURE — 76813 OB US NUCHAL MEAS 1 GEST: CPT | Performed by: OBSTETRICS & GYNECOLOGY

## 2024-04-22 PROCEDURE — 36415 COLL VENOUS BLD VENIPUNCTURE: CPT | Performed by: OBSTETRICS & GYNECOLOGY

## 2024-04-22 NOTE — LETTER
April 24, 2024     Denice Armenta PA-C  [address]  [address]  [place] [address]    Patient: Uyen Ruvalcaba   YOB: 1980   Date of Visit: 4/22/2024       Dear Ms. Armenta:    Thank you for referring Uyen Ruvalcaba to me for evaluation. Below are my notes for this consultation.    If you have questions, please do not hesitate to call me. I look forward to following your patient along with you.         Sincerely,        Quique Hart MD        CC: No Recipients    Quique Hart MD  4/24/2024 12:10 PM  Sign when Signing Visit  A fetal ultrasound was completed. See Ob procedures in Epic for an interpretation and recommendations. Do not hesitate to contact us in [facility] if you have questions.    Quique Hart MD, MSCE  Maternal Fetal Medicine

## 2024-04-22 NOTE — PROGRESS NOTES
Patient chose to have [facility] [id number] Non-Invasive Prenatal Screen [id number] PLUS w/ SCA, WITH fetal sex.  Patient choose to be billed through insurance.     Patient given brochure and is aware [facility] will contact patient's insurance and coordinate coverage.  Provided [facility] contact information. General inquiries [phone], Cost estimates [phone] and [facility] Billing [phone]. Website [email or web address].     Blood collection tubes labeled with patient identifiers (name, medical record number, and date of birth).     Filled out [facility] order form. Patient chose to have blood drawn in our office at time of visit. NIPS was drawn from left arm with a butterfly needle by Asha PORTER     Maternal Fetal Medicine will have results in approximately 5-7 business days and will call patient or notify via [employer].  Patient aware viewing lab result online will reveal fetal sex if ordered.    Patient verbalized understanding of all instructions and no questions at this time.

## 2024-04-23 ENCOUNTER — INITIAL PRENATAL (OUTPATIENT)
Dept: OBGYN CLINIC | Facility: CLINIC | Age: 44
End: 2024-04-23
Payer: COMMERCIAL

## 2024-04-23 VITALS
SYSTOLIC BLOOD PRESSURE: 102 MMHG | DIASTOLIC BLOOD PRESSURE: 60 MMHG | BODY MASS INDEX: 27.97 KG/M2 | WEIGHT: 174 LBS | HEIGHT: 66 IN

## 2024-04-23 DIAGNOSIS — F14.11 HISTORY OF COCAINE ABUSE (HCC): ICD-10-CM

## 2024-04-23 DIAGNOSIS — O09.521 MULTIGRAVIDA OF ADVANCED MATERNAL AGE IN FIRST TRIMESTER: ICD-10-CM

## 2024-04-23 DIAGNOSIS — Z34.92 PRENATAL CARE IN SECOND TRIMESTER: Primary | ICD-10-CM

## 2024-04-23 DIAGNOSIS — Z3A.12 12 WEEKS GESTATION OF PREGNANCY: ICD-10-CM

## 2024-04-23 DIAGNOSIS — Z36.9 UNSPECIFIED ANTENATAL SCREENING: ICD-10-CM

## 2024-04-23 LAB
SL AMB  POCT GLUCOSE, UA: NORMAL
SL AMB POCT URINE PROTEIN: NORMAL

## 2024-04-23 PROCEDURE — 87591 N.GONORRHOEAE DNA AMP PROB: CPT | Performed by: STUDENT IN AN ORGANIZED HEALTH CARE EDUCATION/TRAINING PROGRAM

## 2024-04-23 PROCEDURE — 87491 CHLMYD TRACH DNA AMP PROBE: CPT | Performed by: STUDENT IN AN ORGANIZED HEALTH CARE EDUCATION/TRAINING PROGRAM

## 2024-04-23 PROCEDURE — PNV: Performed by: STUDENT IN AN ORGANIZED HEALTH CARE EDUCATION/TRAINING PROGRAM

## 2024-04-23 PROCEDURE — 81002 URINALYSIS NONAUTO W/O SCOPE: CPT | Performed by: STUDENT IN AN ORGANIZED HEALTH CARE EDUCATION/TRAINING PROGRAM

## 2024-04-23 RX ORDER — ASPIRIN 81 MG/1
81 TABLET, CHEWABLE ORAL DAILY
COMMUNITY

## 2024-04-23 NOTE — ASSESSMENT & PLAN NOTE
45yo  at 12.4wks presents for initial prenatal visit     Pt denies contractions, vaginal bleeding, leakage of fluid.    -continue PNVs   -has appointment to complete PNLs on 24  -[address] [place] [address] 24  -GCCT collected today   -bleeding precautions provided   -follow up 4 weeks

## 2024-04-23 NOTE — ASSESSMENT & PLAN NOTE
-reports this was over 20 years ago, no longer has cravings. No concern for relapse at this time   -does not follow with therapist, does not feel that she needs to as she has been doing well for so many years.   -previously did socially use tobacco, however, quit on her own a few months ago. Never was a daily smoker.

## 2024-04-23 NOTE — PROGRESS NOTES
12 weeks gestation of pregnancy  43yo  at 12.4wks presents for initial prenatal visit     Pt denies contractions, vaginal bleeding, leakage of fluid.    -continue PNVs   -has appointment to complete PNLs on 24  -[address] [place] [address] 24  -GCCT collected today   -bleeding precautions provided   -follow up 4 weeks       Multigravida of advanced maternal age in first trimester  -continue ASA    History of cocaine abuse (HCC)  -reports this was over 20 years ago, no longer has cravings. No concern for relapse at this time   -does not follow with therapist, does not feel that she needs to as she has been doing well for so many years.   -previously did socially use tobacco, however, quit on her own a few months ago. Never was a daily smoker.    FHR 159bpm   Fundal height 12cm

## 2024-04-24 PROBLEM — Z98.890 PERSONAL HISTORY OF SPINE SURGERY: Status: ACTIVE | Noted: 2024-04-24

## 2024-04-24 PROBLEM — Z13.79 GENETIC SCREENING: Status: ACTIVE | Noted: 2024-04-24

## 2024-04-24 LAB
C TRACH DNA SPEC QL NAA+PROBE: NEGATIVE
N GONORRHOEA DNA SPEC QL NAA+PROBE: NEGATIVE

## 2024-04-24 NOTE — PROGRESS NOTES
A fetal ultrasound was completed. See Ob procedures in Epic for an interpretation and recommendations. Do not hesitate to contact us in [facility] if you have questions.    Quique Hart MD, MSCE  Maternal Fetal Medicine

## 2024-04-25 ENCOUNTER — APPOINTMENT (OUTPATIENT)
Dept: LAB | Facility: CLINIC | Age: 44
End: 2024-04-25
Payer: COMMERCIAL

## 2024-04-25 DIAGNOSIS — Z31.430 ENCOUNTER OF FEMALE FOR TESTING FOR GENETIC DISEASE CARRIER STATUS FOR PROCREATIVE MANAGEMENT: ICD-10-CM

## 2024-04-25 DIAGNOSIS — Z36.9 UNSPECIFIED ANTENATAL SCREENING: ICD-10-CM

## 2024-04-25 PROCEDURE — 81220 CFTR GENE COM VARIANTS: CPT

## 2024-04-25 PROCEDURE — 36415 COLL VENOUS BLD VENIPUNCTURE: CPT

## 2024-04-25 PROCEDURE — 81329 SMN1 GENE DOS/DELETION ALYS: CPT

## 2024-04-27 LAB
CFDNA.FET/CFDNA.TOTAL SFR FETUS: ABNORMAL %
CITATION REF LAB TEST: ABNORMAL
FET 13+18+21+X+Y ANEUP PLAS.CFDNA: POSITIVE
FET CHR 21 TS PLAS.CFDNA QL: ABNORMAL
FET CHR 21 TS PLAS.CFDNA QL: NEGATIVE
FET MS X RISK WBC.DNA+CFDNA QL: NOT DETECTED
FET SEX PLAS.CFDNA DOSAGE CFDNA: ABNORMAL
FET TS 13 RISK PLAS.CFDNA QL: NEGATIVE
FET X + Y ANEUP RISK PLAS.CFDNA SEQ-IMP: NOT DETECTED
GA EST FROM CONCEPTION DATE: ABNORMAL D
GESTATIONAL AGE > 9:: YES
LAB DIRECTOR NAME PROVIDER: ABNORMAL
LAB DIRECTOR NAME PROVIDER: ABNORMAL
LABORATORY COMMENT REPORT: ABNORMAL
LIMITATIONS OF THE TEST: ABNORMAL
NEGATIVE PREDICTIVE VALUE: ABNORMAL
PERFORMANCE CHARACTERISTICS: ABNORMAL
POSITIVE PREDICTIVE VALUE: ABNORMAL
REF LAB TEST METHOD: ABNORMAL
SL AMB NOTE:: ABNORMAL
TEST PERFORMANCE INFO SPEC: ABNORMAL

## 2024-04-29 ENCOUNTER — TELEPHONE (OUTPATIENT)
Facility: HOSPITAL | Age: 44
End: 2024-04-29

## 2024-04-29 NOTE — TELEPHONE ENCOUNTER
I called Uyen and left a message at [phone] stating I would like to talk about her [id number] NIPS results. Callback number of [phone] provided.    Merced Huizar, CGC

## 2024-04-30 ENCOUNTER — TELEPHONE (OUTPATIENT)
Facility: HOSPITAL | Age: 44
End: 2024-04-30

## 2024-04-30 NOTE — TELEPHONE ENCOUNTER
I left a second message for Uyen stating I would like to discuss her [id number] NIPS results with her. Provided my callback number of [phone] for whenever she's available, and provided the department number of [phone] if she would like to schedule an appointment or speak with someone else in the department.     Merced Huizar CGC    Addendum: Uyen called me back to say she is traveling for work this week, but will be home on Friday. She and I will try to connect at some point during the week at a time that works for both of us; she asked that I call and leave a voicemail for her stating when I was available, which I will do today.    Merced Huizar CGC

## 2024-05-01 LAB
CITATION REF LAB TEST: NORMAL
CLINICAL INFO: NORMAL
ETHNIC BACKGROUND STATED: NORMAL
GENE DIS ANL CARRIER INTERP-IMP: NORMAL
GENE MUT TESTED BLD/T: NORMAL
LAB DIRECTOR NAME PROVIDER: NORMAL
REASON FOR REFERRAL (NARRATIVE): NORMAL
RECOMMENDATION PATIENT DOC-IMP: NORMAL
REF LAB TEST METHOD: NORMAL
SERVICE CMNT-IMP: NORMAL
SMN1 GENE MUT ANL BLD/T: NORMAL
SPECIMEN SOURCE: NORMAL

## 2024-05-03 ENCOUNTER — TELEMEDICINE (OUTPATIENT)
Facility: HOSPITAL | Age: 44
End: 2024-05-03

## 2024-05-03 DIAGNOSIS — O28.5 ABNORMAL CHROMOSOMAL AND GENETIC FINDING ON ANTENATAL SCREENING OF MOTHER: Primary | ICD-10-CM

## 2024-05-03 DIAGNOSIS — Z31.5 ENCOUNTER FOR PROCREATIVE GENETIC COUNSELING: ICD-10-CM

## 2024-05-03 PROCEDURE — NC001 PR NO CHARGE

## 2024-05-03 NOTE — PROGRESS NOTES
Genetic Counseling Note    Appointment Date:  5/3/2024  Referred By: Denice Armenta,*  YOB: 1980  Partner:  Jose Miguel Ruvalcaba  Indication for Visit:  abnormal serum screen  Pregnancy History:   Estimated Date of Delivery: 2024  Estimated Gestational Age: 14w0d    Virtual Regular Visit    Verification of patient location:    Patient is located at Home in the following state in which I hold an active license: [place]      Assessment/Plan:    Problem List Items Addressed This Visit    None  Visit Diagnoses       Abnormal chromosomal and genetic finding on  screening of mother    -  Primary    Encounter for procreative genetic counseling                     Reason for visit is   Chief Complaint   Patient presents with    Virtual Regular Visit          Encounter provider Merced Huizar      Recent Visits  Date Type Provider Dept   24 Telephone Merced Huizar An    24 Telephone Merced Mcelroy    Showing recent visits within past 7 days and meeting all other requirements  Today's Visits  Date Type Provider Dept   24 Telemedicine Merced Mcelroy    Showing today's visits and meeting all other requirements  Future Appointments  No visits were found meeting these conditions.  Showing future appointments within next 150 days and meeting all other requirements       The patient was identified by name and date of birth. Uyen Ruvalcaba was informed that this is a telemedicine visit and that the visit is being conducted through the Epic Embedded platform. She agrees to proceed. My office door was closed. The patient was notified the following individuals were present in the room: Pat, medical student. She acknowledged consent and understanding of privacy and security of the video platform. The patient has agreed to participate and understands they can discontinue the visit at any time.    Uyen is a 44 y.o. female who presented with her  partner Jose Miguel for genetic counseling to discuss her [id number] NIPS result which was high risk/positive for trisomy 21, or Down syndrome. We discussed the diagnosis of Down syndrome and the morbidity associated with it. We reviewed the finding of intellectual disability. We discussed the most common etiology of chromosomal nondisjunction. We reviewed the accuracy of cell free DNA testing. We discussed that the positive predictive value is 96%. Therefore, there is approximately a 4% risk for a false positive for Trisomy 21 from cell free DNA testing, most likely secondary to the possibility of confined placental mosaicism. We reviewed the option of an early anatomy ultrasound and discussed the benefits and limitations of ultrasound in detecting both congenital anomalies and soft markers associated with Down syndrome. We also reviewed the current recommendations to follow up an abnormal cell free DNA test result with an amniocentesis should the patient desire diagnostic confirmation.    The risks, benefits, and limitations of amniocentesis were discussed with the patient. Amniocentesis is performed starting at 16 weeks gestation, using direct real time ultrasound visualization to avoid both the fetus and the placenta. Once amniotic fluid is withdrawn, laboratory analysis is performed and amniotic fluid alpha-fetoprotein, as well as chromosome and/or microarray analysis is undertaken. The risk of genetic amniocentesis includes, but is not limited to less than 1 in 300 pregnancy loss rate or  delivery rate if 23 weeks or greater, infection, bleeding, rupture of membranes, failure of cells to grow, karyotype error, laboratory error, etc. Occasionally a repeat amniocentesis is necessary due to cell culture failure. Chromosome/microarray analysis from amniocentesis is 99.9% accurate and alpha-fetoprotein analysis can detect approximately 95% of open neural tube defects.      We discussed the options the patient has  regarding the course of the pregnancy. One option is to end the pregnancy early. The legal limit for termination in the [facility] is 23w6d gestation. Should Uyen elect to continue the pregnancy, her pregnancy management would change. [facility] recommends a screening fetal echocardiogram to further assess for heart defects, as about 50% of babies with Down syndrome have a congenital heart defect. We would start fetal monitoring at 32 weeks gestation. The patient would also be scheduled for an ultrasound at 32 weeks for growth assessment.  We discussed the possibility of a referral to [facility] ([facility]) F[facility] and/or Trisomy 21 Program. At the visit they would explain the available services, therapies, and medical treatment the baby might need.     Uyen plans to continue her pregnancy and declines amniocentesis at this time, although she may reconsider the possibility of diagnostic testing in the future. We reviewed the availability of early anatomy ultrasound at approximately 16 weeks gestation for a preliminary survey and the opportunity to gather limited information about potential congenital anomalies. We reviewed that level II anatomy ultrasound is typically performed at approximately 20 weeks gestation. Level II ultrasound evaluation is between 60-80% accurate in detecting major physical birth defects and variations in fetal development that may be associated with chromosome/genetic abnormalities. Level II ultrasound evaluation is not able to detect all birth defects or health problems.     Histories for the patient and her partner's family were taken during our session. Uyen reports a first cousin (father's brother's son) with a birth defect that involved a disconnection between the esophagus and stomach, reminiscent of a tracheoesophageal fistula. This was repaired the and the individual in question is said to be doing well. We discussed that  tracheoesophageal fistula (TEF) is associated with aneuploidy and occasionally seen in individuals with trisomy 21, but is far more likely to not recur in this pregnancy regardless of family history. Further review of family history for the patient and her partner was noncontributory. The family history was not significant for genetic diseases or disorders, intellectual disability, birth defects, fetal loss, or consanguinity.    Patient reports being of White  descent and that her partner is of White  descent. She denies either of them having known Ashkenazi Bahai ancestry. The benefits and limitations of cystic fibrosis (CF), spinal muscular atrophy (SMA), hemoglobinopathy, and expanded carrier screening was discussed. The patient has already completed negative carrier screening for SMA and CF carrier screening is pending. She declined expanded carrier screening at this time. Hemoglobin electrophoresis to screen for hemoglobinopathies is recommended through her referring OB provider if not previously performed.    Lastly, we discussed the fact that everyone in the general population regardless of age, family history, or medical background has approximately a 3-5% risk of having a child with some type of congenital anomaly, genetic disease or intellectual disability. Currently there are no tests available to rule out all birth defects or health problems.    Uyen was provided with our contact information. I encouraged her to call with any questions or concerns.    Plan/Tests Ordered:  1) Patient declined amniocentesis and expanded carrier screening at this time.  2) Early anatomy ultrasound and fetal echocardiogram to be scheduled at the appropriate times.  3) Level II anatomy ultrasound currently scheduled for 6/14/2024 at [facility] site.    Time spent with Genetic Counselor: 30 minutes      HPI     Past Medical History:   Diagnosis Date    Abnormal Pap smear of cervix     approx 2008    C5  vertebral fracture (HCC)     Last Assessed:2/1/2016    Cyst of ovary, right 11/07/2007    HPV (human papilloma virus) infection     Substance abuse (HCC)     hx of cocaine use- 20 yrs ago    Varicella     chickenpox - childhood       Past Surgical History:   Procedure Laterality Date    COLPOSCOPY  01/08/2019    neg    DILATION AND CURETTAGE OF UTERUS      IAB    NECK SURGERY  08/05/2015    1.Anterior C5 corpectomy with greater than 80% of the verebral body removed2.C4-C5,C5-C6(2 level)ianterior cervical discectomy3.Placement of -C6(2 level) interbody PEEK cage for 2 level interbody fusion4.C4-C6(2 level) anterior titanium plate and screw instrumentation for anterior cervical fusion5.Use of microscopy for microdissection/corpectomy6.Harvesting of autologous bone for                         Current Outpatient Medications   Medication Sig Dispense Refill    aspirin 81 mg chewable tablet Chew 81 mg daily      COLLAGEN PO Take by mouth      diphenhydrAMINE (BENADRYL) 25 mg capsule Take 25 mg by mouth every 6 (six) hours as needed for itching      omega-3-acid ethyl esters (LOVAZA) 1 g capsule Take 2 g by mouth 2 (two) times a day      Prenatal Vit-Fe Fumarate-FA (SM Prenatal Vitamins) 28-0.8 MG TABS Take by mouth       No current facility-administered medications for this visit.        No Known Allergies    Review of Systems    Video Exam    There were no vitals filed for this visit.    Physical Exam     Visit Time  Total Visit Duration: 30 minutes

## 2024-05-06 ENCOUNTER — TELEPHONE (OUTPATIENT)
Facility: HOSPITAL | Age: 44
End: 2024-05-06

## 2024-05-07 NOTE — PROGRESS NOTES
I supervised the genetic counselor  I was present in the office and reviewed and agree with the note written on 5/3/2024.  Azeb Jones MD

## 2024-05-08 NOTE — TELEPHONE ENCOUNTER
Left VMM to patient that Dr. Montiel would be available 5/16/24  to meet via VV to discuss amniocentesis procedure, review procedure consent and answer any questions. Dr Montiel will provide visit time that works for Uyen. Provided my direct # to call back to schedule VV appointment w/ Dr. Montiel.

## 2024-05-09 LAB
CFTR FULL MUT ANL BLD/T SEQ: ABNORMAL
CITATION REF LAB TEST: ABNORMAL
CLINICAL INFO: ABNORMAL
ETHNIC BACKGROUND STATED: ABNORMAL
GENE DIS ANL CARRIER INTERP-IMP: ABNORMAL
INDICATION: ABNORMAL
LAB DIRECTOR NAME PROVIDER: ABNORMAL
RECOMMENDATION PATIENT DOC-IMP: ABNORMAL
REF LAB TEST METHOD: ABNORMAL
SERVICE CMNT-IMP: ABNORMAL
SPECIMEN SOURCE: ABNORMAL

## 2024-05-10 NOTE — TELEPHONE ENCOUNTER
Left voicemail stating Dr. Montiel would be available 5/16/24  to meet virtually to discuss amniocentesis procedure, review procedure consent and answer any questions.     @[phone] patient returned my call. Patient was agreeable to 5/16/2024 virtual visit at 11 am. Virtual visit appointment scheduled on [facility] template (overbooked room 2) for Dr. Montiel. Virtual visit instructions sent via [employer]. Patient verbalized understanding.

## 2024-05-13 ENCOUNTER — TELEMEDICINE (OUTPATIENT)
Facility: HOSPITAL | Age: 44
End: 2024-05-13

## 2024-05-13 ENCOUNTER — TELEPHONE (OUTPATIENT)
Facility: HOSPITAL | Age: 44
End: 2024-05-13

## 2024-05-13 DIAGNOSIS — Z31.5 ENCOUNTER FOR PROCREATIVE GENETIC COUNSELING: ICD-10-CM

## 2024-05-13 DIAGNOSIS — O28.5 ABNORMAL CHROMOSOMAL AND GENETIC FINDING ON ANTENATAL SCREENING OF MOTHER: ICD-10-CM

## 2024-05-13 DIAGNOSIS — Z14.1 CYSTIC FIBROSIS CARRIER: Primary | ICD-10-CM

## 2024-05-13 DIAGNOSIS — Z31.440 ENCOUNTER OF MALE FOR TESTING FOR GENETIC DISEASE CARRIER STATUS FOR PROCREATIVE MANAGEMENT: ICD-10-CM

## 2024-05-13 DIAGNOSIS — Z36.0 ENCOUNTER FOR ANTENATAL SCREENING FOR CHROMOSOMAL ANOMALIES: ICD-10-CM

## 2024-05-13 PROCEDURE — NC001 PR NO CHARGE

## 2024-05-13 NOTE — TELEPHONE ENCOUNTER
Uyen's partner Jose Miguel called me to coordinate carrier testing for cystic fibrosis. He will come to  a blood draw kit at the [facility] today 5/13/2024 before 4 PM; he gets out of work at 3 so he estimates he will arrive at approximately 3:20 PM. When he arrives I will meet with briefly to collect information such as his insurance coverage, and then place the test order. The outpatient lab at [facility] is open until 6 PM so he may go there for his blood draw after we meet. I provided directions for the [facility] office in the [facility]. Jose Miguel has my number to call in case he has any difficulty finding the office.    Merced Huizar, CGC

## 2024-05-13 NOTE — PROGRESS NOTES
Genetic Counseling Note    Appointment Date:  2024  Referred By: Denice Armenta,*  YOB: 1980  Partner:  Jose Migueljennifer Ruvalcaba  Indication for Visit:   positive carrier screen for cystic fibrosis  Pregnancy History:   Estimated Date of Delivery: 2024  Estimated Gestational Age: 15w3d      Virtual Brief Visit    This Visit is being completed by telephone. The Patient is located at Home and in the following state in which I hold an active license: [place]    The patient was identified by name and date of birth. Uyen Ruvalcaba was informed that this is a telemedicine visit and that the visit is being conducted through Telephone. My office door was closed. No one else was in the room. She acknowledged consent and understanding of privacy and security of the video platform. The patient has agreed to participate and understands they can discontinue the visit at any time.    Uyen is a 44 y.o. female who presented for genetic counseling to discuss her positive carrier screening result for cystic fibrosis. The patient reached out to me on 5/10/2024 when she saw her cystic fibrosis test result. I previously met with Uyen and her partner Jose Miguel on 5/3/2024 to discuss her positive/high risk NIPS result for trisomy 21. Early anatomy ultrasound with amniocentesis to confirm the diagnosis of trisomy 21 in the pregnancy is currently scheduled for 2024 at the [facility] office.    Carrier screening for Uyen through [facility] revealed the heterozygous CFTR variant [id number] ([id number]), consistent with being an unaffected carrier of cystic fibrosis. We discussed that cystic fibrosis (CF) is a genetic condition in which biallelic pathogenic variants in the CFTR gene lead to reduced or absent function of the cystic fibrosis transmembrane conductance regulator protein. CF affects epithelia in several organs resulting in complex, multi-system disease including the exocrine pancreas, intestine,  respiratory tract, hepatobiliary system, and the exocrine sweat glands. Prognosis and treatment options are dependent in part upon the specific variants present in an affected individual. We reviewed the autosomal recessive inheritance pattern. Should the father of the baby be a carrier, the pregnancy is at 25% risk to be affected and prenatal diagnosis, such as amniocentesis is available.      We discussed that cystic fibrosis can be diagnosed in multiple ways. A clinical diagnosis may be made via assays such as a sweat chloride test, and/or molecular genetic analysis of the CFTR gene may find the causative variants in an affected individual. Uyen reports that her sister has received negative CF carrier screening in the past and was not sure if that meant Uyen's test result was inaccurate. We discussed that Uyen's test result is accurate and she is a carrier, however any sibling would likely have an only 50% chance of being a carrier as well, which could explain her sister's negative result. We reviewed that some CF carrier screening is conducted by targeted variant testing, in which only the most common CF variants are detected; if this testing was performed for the patient's sister, it could have resulted as negative as Gabes variant is somewhat rare and not as likely to be present on these targeted variant panels. We discussed that clinical reports of CF patients with Uyen's variant are more variable, and some individuals have milder/fewer of the symptoms associated with classic cystic fibrosis. Using the example of sweat chloride testing, it is possible that if Uyen were to have a child with CF, the sweat test result would be negative. With the currently available information, Uyen's partner Jose Miguel's risk to be a carrier is approximately 1/25 or 4%. As two carrier parents have a 25% chance per pregnancy of having an affected child, the current risk estimate for the pregnancy to have CF is 1/100 or  1%. If Jose Miguel is a carrier and a child were to inherit both parents' variants, the severity of CF would depend in part on the specific variants in question. While the exact phenotype of a child affected with CF will not be clear in the prenatal setting, we are able to perform molecular testing on a sample obtained via amniocentesis and thus definitively confirm or rule out CF from a genetic standpoint.    Uyen and ABDIAZIZ have previously discussed the availability of prenatal consult with the [facility] ([facility]s trisomy 21 program in light of the high chance for her pregnancy to be affected with Down syndrome. We reviewed that this would be more strongly recommended in the event that both trisomy 21 and CF are confirmed to be present in the pregnancy. We discussed that some individuals with comorbid CF and trisomy 21 have been observed to have more severe symptoms, as there is some overlap in typical clinical features (e.g. gastrointestinal and respiratory issues). Thus, it would be important to meet with a clinical  and other specialists to discuss management of both conditions. As the chance for Uyen's pregnancy to have trisomy 21 is approximately 96% and the chance for the pregnancy to have CF is approximately 1%, the chance for the pregnancy to be affected with both conditions is slightly under 1% (0.96%).     We reviewed that molecular testing via amniocentesis is most easily conducted by doing a targeted test for the parental variants if bothj are known to be carriers. Full CFTR gene sequencing is available, but it will likely take longer to result and would be unnecessary in the 96% chance that Jose Miguel is not a carrier. Uyen agrees to proceed with partner testing first. It is probable that Jose Miguel's carrier testing results will not have returned by the time of amniocentesis; when it is performed, we will ask the lab to set aside a portion of the sample for potential molecular  "testing and pursue this if Jose Miguel is found to be a carrier. I will reach out to the laboratory to discuss this plan and make any adjustments as needed. Uyen will reach out to Jose Miguel and have him come to the [facility] office to  a test kit for carrier screening through [employer].    With regard to the original reason for amniocentesis (positive NIPS result for trisomy 21), we discussed the plans to order fluorescence in situ hybridization (FISH) followed by chromosome analysis. We reviewed the expected turnaround times for each test (3-5 days for FISH, 1-2 weeks for chromosome analysis). We discussed that we will reach out once results come in, but that they may be visible in the patient's MyChart before we are able to contact her. We reviewed that confirmation of trisomy 21 in the pregnancy may find trisomy 21 resulting from three separate copies of chromosome 21 in every call, mosaic trisomy 21 in which some cells have three copies and others have two, or translocation Down syndrome in which a (possibly inherited) chromosomal structural rearrangement results in an extra \"dose\" of chromosome 21, the latter of which has an increased recurrence risk in future pregnancies.     Full histories for the patient and her partner's family were taken during our initial session on 5/3/2024. We did not review family history in further detail at this visit.    Lastly, we discussed the fact that everyone in the general population regardless of age, family history, or medical background has approximately a 3-5% risk of having a child with some type of congenital anomaly, genetic disease or intellectual disability. Currently there are no tests available to rule out all birth defects or health problems.    Uyen was provided with our contact information. I encouraged her to call with any questions or concerns.    Plan/Tests Ordered:  1) Patient's partner will pursue CF carrier screening through [facility], to  test kit at " [facility] site.  2) Early anatomy ultrasound and amniocentesis scheduled on 2024 at [facility] site. To order FISH, chromosome analysis, AFAFP, and hold cells for possible molecular testing.      Time spent with Genetic Counselor: 20 minutes      Assessment/Plan:    Problem List Items Addressed This Visit    None  Visit Diagnoses       Cystic fibrosis carrier    -  Primary    Encounter for  screening for chromosomal anomalies        Abnormal chromosomal and genetic finding on  screening of mother        Encounter of male for testing for genetic disease carrier status for procreative management        Encounter for procreative genetic counseling                Recent Visits  Date Type Provider Dept   24 Telephone Howard Oquendo RN An    Showing recent visits within past 7 days and meeting all other requirements  Today's Visits  Date Type Provider Dept   24 Telemedicine Merced Huizar An    Showing today's visits and meeting all other requirements  Future Appointments  No visits were found meeting these conditions.  Showing future appointments within next 150 days and meeting all other requirements         Visit Time  Total Visit Duration: 20 minutes

## 2024-05-15 ENCOUNTER — TELEPHONE (OUTPATIENT)
Facility: HOSPITAL | Age: 44
End: 2024-05-15

## 2024-05-15 NOTE — TELEPHONE ENCOUNTER
"[facility] discuss potential testing plans regarding Uyen's upcoming amniocentesis. Discussed with a molecular genetic counselor that the patient is a carrier for cystic fibrosis and we are currently unsure of whether her partner Jose Miguel is a carrier, but testing is pending; confirmed with the GC that it is best to order targeted variant testing if/when Jose Miguel is found to be a carrier rather than full gene sequencing. As his carrier status will not be known at the time of the amniocentesis procedure, we will instruct the lab to \"hold cells\", which the GC confirmed means they will hold a sample for six weeks. In order to perform parental variant testing they will need a blood sample from each parent. The GC confirmed that if custodial studies are ordered for Uyen and a blood sample collected on the day of the procedure, they will be able to use this for testing of her variant as well. Alternately, she could provide a blood sample at another time (as Jose Miguel would also need to do). I will follow up with Uyen before the amniocentesis procedure to see which she would prefer.    Merced Huizar, [place]    "

## 2024-05-16 ENCOUNTER — TELEMEDICINE (OUTPATIENT)
Dept: PERINATAL CARE | Facility: CLINIC | Age: 44
End: 2024-05-16
Payer: COMMERCIAL

## 2024-05-16 ENCOUNTER — TELEPHONE (OUTPATIENT)
Dept: OBGYN CLINIC | Facility: CLINIC | Age: 44
End: 2024-05-16

## 2024-05-16 DIAGNOSIS — Z36.0 ENCOUNTER FOR ANTENATAL SCREENING FOR CHROMOSOMAL ANOMALIES BY AMNIOCENTESIS: Primary | ICD-10-CM

## 2024-05-16 DIAGNOSIS — O09.519 ADVANCED MATERNAL AGE, PRIMIGRAVIDA, ANTEPARTUM: ICD-10-CM

## 2024-05-16 PROBLEM — O28.0 ABNORMAL MATERNAL SERUM SCREENING TEST: Status: ACTIVE | Noted: 2024-05-16

## 2024-05-16 PROCEDURE — 99215 OFFICE O/P EST HI 40 MIN: CPT | Performed by: OBSTETRICS & GYNECOLOGY

## 2024-05-16 NOTE — PROGRESS NOTES
Virtual Regular Visit    Verification of patient location:    Patient is located in the following state in which I hold an active license [place]      The patient was identified by name and date of birth. Uyen Ruvalcaba was informed that this is a telemedicine visit and that the visit is being conducted through the Epic Embedded platform. She agrees to proceed..  My office door was closed. No one else was in the room.  She acknowledged consent and understanding of privacy and security of the video platform. The patient has agreed to participate and understands they can discontinue the visit at any time.    Patient is aware this is a billable service.         PREPROCEDURE H&P: MATERNAL-FETAL MEDICINE    Uyen Ruvalcaba is a 44 y.o. year-old para  at 15w6d here with chief complaint of preprocedure History and Physical Examination prior to planned office amniocentesis.  History of Present Illness as it relates to this planned procedure is abnormal blood testing.  Her history is as follows:    Past Medical History:   Diagnosis Date    Abnormal Pap smear of cervix     approx     C5 vertebral fracture (HCC)     Last Assessed:2016    Cyst of ovary, right 2007    HPV (human papilloma virus) infection     Substance abuse (HCC)     hx of cocaine use- 20 yrs ago    Varicella     chickenpox - childhood     Past Surgical History:   Procedure Laterality Date    COLPOSCOPY  2019    neg    DILATION AND CURETTAGE OF UTERUS      IAB    NECK SURGERY  2015    1.Anterior C5 corpectomy with greater than 80% of the verebral body removed2.C4-C5,C5-C6(2 level)ianterior cervical discectomy3.Placement of -C6(2 level) interbody PEEK cage for 2 level interbody fusion4.C4-C6(2 level) anterior titanium plate and screw instrumentation for anterior cervical fusion5.Use of microscopy for microdissection/corpectomy6.Harvesting of autologous bone for                       OB History    Para Term  AB Living   2    0   1 0   SAB IAB Ectopic Multiple Live Births     1            # Outcome Date GA Lbr Doc/2nd Weight Sex Type Anes PTL Lv   2 Current            1 IAB              Social History     Tobacco Use   Smoking Status Former    Current packs/day: 0.00    Types: Cigarettes   Smokeless Tobacco Never   Tobacco Comments    socially      Social History     Substance and Sexual Activity   Alcohol Use Not Currently    Comment: none with pregnancy      Social History     Substance and Sexual Activity   Drug Use Not Currently    Types: Cocaine    Comment: denies self, -truong- denies, denies family use        Current Outpatient Medications:     COLLAGEN PO, Take by mouth, Disp: , Rfl:     diphenhydrAMINE (BENADRYL) 25 mg capsule, Take 25 mg by mouth every 6 (six) hours as needed for itching, Disp: , Rfl:     omega-3-acid ethyl esters (LOVAZA) 1 g capsule, Take 2 g by mouth 2 (two) times a day, Disp: , Rfl:     Prenatal Vit-Fe Fumarate-FA ( Prenatal Vitamins) 28-0.8 MG TABS, Take by mouth, Disp: , Rfl:   No Known Allergies      Examination:  Vitals: Last menstrual period 01/26/2024.  Physical Exam  Constitutional:       General: She is not in acute distress.     Appearance: Normal appearance. She is not ill-appearing, toxic-appearing or diaphoretic.   HENT:      Head: Normocephalic and atraumatic.      Nose: No congestion or rhinorrhea.   Eyes:      General: No scleral icterus.        Right eye: No discharge.         Left eye: No discharge.      Extraocular Movements: Extraocular movements intact.   Pulmonary:      Effort: Pulmonary effort is normal. No respiratory distress.   Musculoskeletal:      Cervical back: Normal range of motion.   Skin:     Coloration: Skin is not pale.      Findings: No erythema, lesion or rash.   Neurological:      General: No focal deficit present.      Mental Status: She is alert and oriented to person, place, and time.   Psychiatric:         Mood and Affect: Mood normal.         Behavior:  Behavior normal.       Relevant lab data:  Blood type: O   Rh type: positive - from 2015 lab result - encouraged she get blood work done asap.        Assessment and Plan: Ms. Ruvalcaba is a 44 y.o. year-old para  here for preoperative History and Physical Examination prior to planned office amniocentesis.  Risks, benefits and alternatives to this procedure were reviewed with the patient.  Risks specific to the procedure (beyond the standard [facility] procedure consent) that I reviewed with the patient prior to the procedure include: amniocentesis: ruptured membranes leading to infection and pregnancy loss,  birth, vaginal bleeding, bowel or bladder injury, pain, and failure to achieve desired results.  Today there are no obvious contraindications to planned procedure.  Verbal informed consent was obtained 24 at 1130 am and she will sign printed form on day of the procedure.    At the conclusion of today's encounter, all questions were answered to her satisfaction.  Thank you very much for this kind referral and please do not hesitate to contact me with any further questions or concerns.    The time spent on this patient on the encounter date included 5 minutes previsit service time reviewing records and precharting, 33 minutes face-to-face service time counseling regarding results and coordinating care, and  4 minutes charting, totalling 43 minutes.    -Lissett Montiel MD

## 2024-05-22 ENCOUNTER — PROCEDURE VISIT (OUTPATIENT)
Facility: HOSPITAL | Age: 44
End: 2024-05-22
Payer: COMMERCIAL

## 2024-05-22 VITALS
BODY MASS INDEX: 29.09 KG/M2 | HEART RATE: 95 BPM | WEIGHT: 174.6 LBS | SYSTOLIC BLOOD PRESSURE: 108 MMHG | DIASTOLIC BLOOD PRESSURE: 66 MMHG | HEIGHT: 65 IN

## 2024-05-22 DIAGNOSIS — O28.0 ABNORMAL MATERNAL SERUM SCREENING TEST: ICD-10-CM

## 2024-05-22 DIAGNOSIS — Z36.0 ENCOUNTER FOR ANTENATAL SCREENING FOR CHROMOSOMAL ANOMALIES BY AMNIOCENTESIS: Primary | ICD-10-CM

## 2024-05-22 DIAGNOSIS — O09.519 ADVANCED MATERNAL AGE, PRIMIGRAVIDA, ANTEPARTUM: ICD-10-CM

## 2024-05-22 PROCEDURE — 76811 OB US DETAILED SNGL FETUS: CPT | Performed by: OBSTETRICS & GYNECOLOGY

## 2024-05-22 PROCEDURE — 76946 ECHO GUIDE FOR AMNIOCENTESIS: CPT | Performed by: OBSTETRICS & GYNECOLOGY

## 2024-05-22 PROCEDURE — 99214 OFFICE O/P EST MOD 30 MIN: CPT | Performed by: OBSTETRICS & GYNECOLOGY

## 2024-05-22 PROCEDURE — 59000 AMNIOCENTESIS DIAGNOSTIC: CPT | Performed by: OBSTETRICS & GYNECOLOGY

## 2024-05-22 PROCEDURE — 36415 COLL VENOUS BLD VENIPUNCTURE: CPT | Performed by: OBSTETRICS & GYNECOLOGY

## 2024-05-22 NOTE — PATIENT INSTRUCTIONS
Amniocentesis   WHAT YOU NEED TO KNOW:   An amniocentesis is a procedure that is done to take a sample of amniotic fluid. Amniotic fluid surrounds your baby inside the amniotic sac. This procedure is done to diagnose certain birth defects and genetic conditions. Genetic conditions are health conditions that are passed down from parents to their baby. An amniocentesis may also show infection or how developed your baby's lungs are later in your pregnancy.  DISCHARGE INSTRUCTIONS:   Seek care immediately if:   You have severe pain or cramping in your abdomen or back.    You have blood or fluid coming from your vagina.    Contact your healthcare provider if:   You are more than 20 weeks pregnant and your baby is moving much more or much less after the amniocentesis.    You have a fever or chills.    You have redness or swelling at the injection site.    You have questions or concerns about your condition or care.    Follow up with your doctor as directed:  Write down your questions so you remember to ask them during your visits.  Activities:   Return to activities as directed. Avoid strenuous activities for 24 hours after the amniocentesis. These include jogging, aerobic exercise, and sex.    Ask your healthcare provider if you can return to work  after your procedure. If you do go back to work, rest or sit as much as possible.    © Copyright [employer] 2023 Information is for End User's use only and may not be sold, redistributed or otherwise used for commercial purposes.  The above information is an  only. It is not intended as medical advice for individual conditions or treatments. Talk to your doctor, nurse or pharmacist before following any medical regimen to see if it is safe and effective for you.

## 2024-05-22 NOTE — PROGRESS NOTES
Amniocentesis pre-procedure timeout performed.  Verified patient name and .  Confirmed procedure to be performed.   Reviewed Maternal Blood type  O  Positive   Rhogam needed: no  Reviewed relevant allergies .   No Known Allergies  Reviewed Maternal medication list.     Specimen labeled with patient name/ /specimen collection date.  Validated correct patient identification on procedure signed consent and specimen label.    Patient tolerated procedure without difficulty.  Verbally reviewed with patient post amniocentesis instructions including warning symptoms to report ( bleeding. cramping, leaking, fever).  Physician contact information given.  Patient was able to verbally teach back instructions.    Amniocentesis post procedure with Dr. Montiel.   Physician reviewed specimen labeling.  [facility] orders placed for FISH #[id number], Chromosomes, Maternal Cell Contamination, and HOLD CELLS.  care home studies drawn from pts left AC using a straight needle without difficulties.  Stef specimen taken to lab and given to Elvia.

## 2024-05-22 NOTE — LETTER
"   Date: 2024    Jessica Alamo DO  [address]  [address]  [place] [address]    Patient: Uyen Ruvalcaba   YOB: 1980   Date of Visit: 2024   Gestational age 16w6d   Nature of this communication: Routine though please note there are anomalies on ultrasound, and her amniocentesis (done ) was uncomplicated.       This patient was seen recently in our  office.  Please see ultrasound report under \"OB Procedures\" tab.  Please don't hesitate to contact our office with any concerns or questions.      Sincerely,      Lissett Montiel MD  Attending Physician, Maternal-Fetal Medicine  [facility]      "

## 2024-05-23 ENCOUNTER — TELEPHONE (OUTPATIENT)
Facility: HOSPITAL | Age: 44
End: 2024-05-23

## 2024-05-23 ENCOUNTER — TELEPHONE (OUTPATIENT)
Dept: PERINATAL CARE | Facility: CLINIC | Age: 44
End: 2024-05-23

## 2024-05-23 NOTE — PROGRESS NOTES
"Late entry:    [facility]: Ms. Ruvalcaba was seen today for  amniocentesis which was performed and was uncomplicated .  See ultrasound report under \"OB Procedures\" tab.   The time spent on this established patient on the encounter date included 5 minutes previsit service time reviewing records and precharting, 20 minutes face-to-face service time counseling regarding results and coordinating care (separate from time performing procedure), and  5 minutes charting, totalling 30 minutes.  Please don't hesitate to contact our office with any concerns or questions.  -Lissett Montiel MD      "

## 2024-05-23 NOTE — TELEPHONE ENCOUNTER
I called Uyen at [phone] to discuss her partner Jose Miguel's cystic fibrosis carrier screening results, which were negative. She was glad to hear this. We reviewed that based on this information, we do not need to proceed further with CF testing on the sample obtained from her amniocentesis yesterday. She has her fetal echocardiogram scheduled on Tuesday, May 28th, after which I will meet with her and Jose Miguel again. She had no further questions at this time.    Merced Huizar, CGC

## 2024-05-23 NOTE — TELEPHONE ENCOUNTER
"Maternal Fetal Medicine post procedure phone call made to patient.     Patient describes pain level of 1/10. \"Not painful just sore\" \"like I did crunches\" \"muscular soreness\". Advised patient to stay hydrated, get rest and take tylenol as needed. Reviewed slight spotting of blood as well as mild abdominal or uterine cramping may commonly occur in the first couple of days after procedure.   Drinking fluids can lessen abdominal cramping.   If applicable: Tenderness or bruising at site of needle insertion is also possible.     Patient denies bleeding, leaking of fluid, chills ,fever, severe cramping or vaginal discharge.    Patient advised to report any of the following Immediately:  Leaking or gush of fluid from vagina  Bright red bleeding  Chills or Fever greater than 100.4F  Severe cramping  Foul smelling vaginal discharge    Patient verbalized understanding, no questions.  Provided Maternal Fetal Medicine's office number [phone].   Call routed to Dr. Montiel (provider who performed procedure).       "

## 2024-05-24 ENCOUNTER — NURSE TRIAGE (OUTPATIENT)
Age: 44
End: 2024-05-24

## 2024-05-24 ENCOUNTER — TELEPHONE (OUTPATIENT)
Facility: HOSPITAL | Age: 44
End: 2024-05-24

## 2024-05-24 ENCOUNTER — TELEPHONE (OUTPATIENT)
Age: 44
End: 2024-05-24

## 2024-05-24 ENCOUNTER — ROUTINE PRENATAL (OUTPATIENT)
Dept: OBGYN CLINIC | Facility: CLINIC | Age: 44
End: 2024-05-24
Payer: COMMERCIAL

## 2024-05-24 VITALS
WEIGHT: 175.8 LBS | BODY MASS INDEX: 29.25 KG/M2 | SYSTOLIC BLOOD PRESSURE: 116 MMHG | HEART RATE: 89 BPM | OXYGEN SATURATION: 99 % | DIASTOLIC BLOOD PRESSURE: 60 MMHG

## 2024-05-24 DIAGNOSIS — O28.0 ABNORMAL MATERNAL SERUM SCREENING TEST: ICD-10-CM

## 2024-05-24 DIAGNOSIS — Z3A.17 17 WEEKS GESTATION OF PREGNANCY: Primary | ICD-10-CM

## 2024-05-24 DIAGNOSIS — Z34.92 PRENATAL CARE IN SECOND TRIMESTER: ICD-10-CM

## 2024-05-24 LAB
SL AMB  POCT GLUCOSE, UA: NEGATIVE
SL AMB POCT URINE PROTEIN: NEGATIVE

## 2024-05-24 PROCEDURE — PNV: Performed by: OBSTETRICS & GYNECOLOGY

## 2024-05-24 PROCEDURE — 81002 URINALYSIS NONAUTO W/O SCOPE: CPT | Performed by: OBSTETRICS & GYNECOLOGY

## 2024-05-24 NOTE — TELEPHONE ENCOUNTER
[facility] called regarding lab result when attempting to warm transfer to [facility] I was stuck on warm transfer and had to refresh losing the call.

## 2024-05-24 NOTE — TELEPHONE ENCOUNTER
Regarding: abnormal results  ----- Message from Татьяна SHERMAN sent at 5/24/2024  3:53 PM EDT -----  Dianne from [facility] called to report abnormal results from pt's Trisomy 21. Requesting to call pt and review findings. Thank you.

## 2024-05-24 NOTE — ASSESSMENT & PLAN NOTE
Pt doing well today, no complaints.   Denies vb, ctx and lof.   Prenatal labs not yet completed, ordered. Has AFP ordered. She plans to complete them next week.   Genetic screening high risk T21- just completed amnio. Results pending  Has fetal echo on 5/28 followed by genetic counseling appt.   Has follow up for 20w scan on 6/14. Had early anatomy completed on 5/22 with concern for EIF, cardiac defect, short long bones.   She inquired about IVF if amnio results are positive for T21 for PIGT. She was provided with info today.   Precautions reviewed, f/u in 3 weeks or prn.

## 2024-05-24 NOTE — PROGRESS NOTES
Problem List Items Addressed This Visit       17 weeks gestation of pregnancy - Primary     Pt doing well today, no complaints.   Denies vb, ctx and lof.   Prenatal labs not yet completed, ordered. Has AFP ordered. She plans to complete them next week.   Genetic screening high risk T21- just completed amnio. Results pending  Has fetal echo on  followed by genetic counseling appt.   Has follow up for 20w scan on . Had early anatomy completed on  with concern for EIF, cardiac defect, short long bones.   She inquired about IVF if amnio results are positive for T21 for PIGT. She was provided with info today.   Precautions reviewed, f/u in 3 weeks or prn.          Abnormal maternal serum screening test     Other Visit Diagnoses       Prenatal care in second trimester        Relevant Orders    POCT urine dip (Completed)            Uyen WEBER Jordon is a 44 y.o.  at 17w0d who presents today for routine prenatal visit.     /60 (BP Location: Right arm, Patient Position: Sitting, Cuff Size: Standard)   Pulse 89   Wt 79.7 kg (175 lb 12.8 oz)   LMP 2024   SpO2 99%   BMI 29.25 kg/m²

## 2024-05-24 NOTE — PROGRESS NOTES
17w0d.   Materni 21 testing-abnormal Trisomy 21  CF testing- positive for carrier.   SMA- negative.   AFP-pending in chart.  PN 1 labs not completed.   Amnio 5/22/24- results pending.   VB- no. LOF- no  Cramping- some.

## 2024-05-24 NOTE — TELEPHONE ENCOUNTER
[facility] Genetics Counselor discussed results with patient. Pt aware. To make a plan with [facility].

## 2024-05-24 NOTE — TELEPHONE ENCOUNTER
I called Uyen about the FISH results from her amniocentesis procedure on 5/22. FISH found three signals for chromosome 21, consistent with trisomy 21/Down syndrome in the fetus. Uyen was not surprised to hear this information as it had been her assumption after the positive NIPS result. We reviewed the plan for fetal echocardiogram on Tuesday followed by genetic counseling visit. The patient plans to bring her mother to the appointment. Uyen had no further questions at this time.    Merced Huizar, CGC

## 2024-05-28 ENCOUNTER — OFFICE VISIT (OUTPATIENT)
Facility: HOSPITAL | Age: 44
End: 2024-05-28

## 2024-05-28 ENCOUNTER — TELEPHONE (OUTPATIENT)
Dept: OBGYN CLINIC | Facility: CLINIC | Age: 44
End: 2024-05-28

## 2024-05-28 ENCOUNTER — ROUTINE PRENATAL (OUTPATIENT)
Age: 44
End: 2024-05-28
Payer: COMMERCIAL

## 2024-05-28 DIAGNOSIS — O35.13X0 FETAL TRISOMY 21 AFFECTING CARE OF MOTHER, ANTEPARTUM, SINGLE OR UNSPECIFIED FETUS: Primary | ICD-10-CM

## 2024-05-28 DIAGNOSIS — Z36.89 NORMAL FETAL CARDIAC EXAM: Primary | ICD-10-CM

## 2024-05-28 DIAGNOSIS — Z31.5 ENCOUNTER FOR PROCREATIVE GENETIC COUNSELING: ICD-10-CM

## 2024-05-28 PROCEDURE — 76820 UMBILICAL ARTERY ECHO: CPT | Performed by: PEDIATRICS

## 2024-05-28 PROCEDURE — 76827 ECHO EXAM OF FETAL HEART: CPT | Performed by: PEDIATRICS

## 2024-05-28 PROCEDURE — NC001 PR NO CHARGE

## 2024-05-28 PROCEDURE — 99205 OFFICE O/P NEW HI 60 MIN: CPT | Performed by: PEDIATRICS

## 2024-05-28 PROCEDURE — 93325 DOPPLER ECHO COLOR FLOW MAPG: CPT | Performed by: PEDIATRICS

## 2024-05-28 PROCEDURE — 76825 ECHO EXAM OF FETAL HEART: CPT | Performed by: PEDIATRICS

## 2024-05-28 NOTE — TELEPHONE ENCOUNTER
----- Message from Kendy GIL sent at 2024 11:36 AM EDT -----  Regarding: FW: Scheduling pregnancy termination  This is a SLOGA patient would we be able to find an appointment for her to come in and sign consents?  ----- Message -----  From: Merced Huizar  Sent: 2024  11:13 AM EDT  To: Kendy Marquez; Vickie Wall, RN; #  Subject: Scheduling pregnancy termination                 Antonio Marquez,     This patient has a pregnancy with trisomy 21 and would like to schedule a pregnancy termination. Her FISH results are in the Media tab of her chart. I am cc'ing Dr. Alamo as she saw the patient on 24 and Dr. Anderson as she was scheduled to see her on 2024.     Please let me know if there is any other information I can provide that would be helpful.    Best,  Merced Huizar   genetic counselor

## 2024-05-28 NOTE — PROGRESS NOTES
Genetic Counseling Note    Appointment Date:  2024  Referred By: Denice Armenta,*  YOB: 1980  Partner:  Jose Miguel Ruvalcaba  Indication for Visit:   fetal trisomy 21  Pregnancy History:   Estimated Date of Delivery: 2024  Estimated Gestational Age: 17w4d    Uyen is a 44 y.o. female who presented with her partner Jose Miguel for genetic counseling to discuss the finding of trisomy 21 in her pregnancy. I have previously met with Uyen on 5/3/2024 and 2024 to discuss her NIPS result which was positive for trisomy 21 and on 2024 to discuss her cystic fibrosis carrier status. Cystic fibrosis carrier screening for Uyen's partner Jose Miguel was negative, meaning the risk for cystic fibrosis is low in this or any of the couple's pregnancies. Uyen had an amniocentesis on 2024 to confirm the diagnosis of trisomy 21 in her pregnancy. FISH results returned on 2024 which showed three signals for chromosome 21, consistent with trisomy 21. Chromosome analysis is pending. AF-AFP analysis showed 0.67 MoM. Early anatomy ultrasound on 2024 showed shortened long bones (humerus and femur <1st percentile), small pericardial effusion, echogenic intracardiac focus, and a possible cardiac defect such as AV canal. Fetal echocardiogram immediately before today's visit on 2024 showed a structurally normal heart with no immediate concern for AV canal or other cardiac defects. Dr. Macias, pediatric cardiologist, recommended a follow-up echocardiogram in 4-8 weeks due to the inability to see every structure clearly at the current gestational age.     I spoke with Uyen and Jose Miguel after their fetal echocardiogram and consult with Dr. Macias. We reviewed that the findings are encouraging but that follow-up is recommended due to limitations of imaging at earlier gestational ages. We had previously discussed the approximately 50% risk for cardiac defects in individuals with trisomy 21.     Uyen  and Jose Miguel have had a hard time recently due to receiving multiple pieces of discouraging news. Emotional support was provided throughout the session. Uyen shared that although the couple had initially planned to continue the pregnancy, they now wish to terminate. She is concerned by the unpredictability and variability of the medical issues associated with trisomy 21 and the couple's ability to care for a child with special medical needs. Uyen is also concerned about who would care for their child after her and Jose Miguel's death, given that they have no relatives nearby who could plan to assume care. Uyen was very tearful while discussing this decision.     We discussed that while FISH itself is not considered as definitive a confirmation as full chromosome analysis, the positive NIPS results and ultrasound findings are also both consistent with trisomy 21 in the pregnancy. Given this, it would be reasonable to proceed before the chromosome analysis results are available. We reviewed the availability of a referral to [facility] ([facility] Fetal Diagnosis Center and/or Trisomy 21 Program, if it would be beneficial to hear more about the  prognosis in making the decision. At the visit they would explain the available services, therapies, and medical treatment the baby might need. However, they would not be able to provide certainty about the prognosis, as some variability is inevitable. Uyen understands this and does not feel another consult would provide useful information.     We reviewed that pregnancy termination services are available within the [facility]'s network from various OB providers. If desired, I will contact the surgical coordinator who will then review the availability of different physicians and OR space, and the patient will be contacted to schedule the initial consult. We briefly discussed the options of induction vs. D&E, and that different options are available for  disposition of remains. Uyen confirmed that she would like to proceed with termination. I will message the surgical coordinator and the patient will be contacted soon for next steps.     We briefly discussed the question of recurrence risk, and that the most likely outcome is that a future pregnancy would not be affected with the same condition. Uyen is concerned about this possibility and is not sure that she could deal with being in the same situation again. She has been considering the possibility of IVF with preimplantation genetic testing for aneuploidy to ensure only unaffected embryos are implanted, but is aware of the difficulties and limitations of the IVF process. We reviewed that most cases of trisomy 21 occur sporadically as a result of chromosomal nondisjunction, but that a small percentage occur secondary to chromosomal translocations. Should the patient be a carrier of a balanced translocation the risk for the pregnancy to be affected could be substantially higher. While this is not the most likely scenario, the pending chromosome analysis will provide further insight into this possibility and we will discuss the findings if appropriate. We reviewed the availability of another genetic counseling consult in the future should Uyen wish to discuss recurrence risk or future testing options in greater detail.     Histories for the patient and her partner's family were taken during our initial session on 5/3/2024. We did not discuss family history during today's visit.    Lastly, we discussed the fact that everyone in the general population regardless of age, family history, or medical background has approximately a 3-5% risk of having a child with some type of congenital anomaly, genetic disease or intellectual disability. Currently there are no tests available to rule out all birth defects or health problems.    Uyen was provided with our contact information. I encouraged her to call with any  questions or concerns.    Plan/Tests Ordered:  1) Patient elects pregnancy termination, I will contact OB surgical coordinator for scheduling.  2) Amniocentesis chromosome analysis pending, I will contact patient with results.  3) Genetic counseling as indicated/desired in the future.    Time spent with Genetic Counselor: 10 minutes

## 2024-05-28 NOTE — PROGRESS NOTES
Fetal Cardiology Consult    Date of Visit:             5/28/2024  Gestational Age:           17w4d   Estimated Date of Delivery:  11/1/24     Reason for consultation: Trisomy 21    Fetal Echocardiogram demonstrated grossly normal cardiac anatomy and function. Crux of the heart is intact with appropriate AV valve anatomy - tricuspid valve on slightly more inferior plain as the mitral valve plane. No obvious intracardiac shunts besides PFO.    I reviewed the normal findings. And the limitations due to early GA.  We also discussed, that due to the technical limitations of fetal echocardiography and the nature of fetal circulation, a number of cardiovascular defects cannot be definitively ruled out at this stage.  Examples include but are not limited to: ASD, PDA, small VSD, coarctation of the aorta, partial anomalous pulmonary venous connection. Please see full echocardiogram report under OB procedures.    Assessment and Recommendations:  -Grossly normal fetal cardiac anatomy and function. I would like to repeat the fetal echo in 4-8 weeks, to better visualize the AV valves and ventricular septum, given the trisomy 21 diagnosis and the suboptimal images due to patient's early GA for today's echo.    Omid Macias MD  Pediatric Cardiology  [facility]  Phone:[phone]  Fax: [phone]  [email or web address]    I spent 60minutes - on the day of service - reviewing the patient's chart, reviewing previous imaging studies, counseling the patient about the fetal cardiac anatomy and plan, coordinating care, and documenting care.

## 2024-05-28 NOTE — LETTER
May 28, 2024     Patient: Uyen Ruvalcaba  YOB: 1980  Date of Visit: 5/28/2024      To Whom it May Concern:    Uyen Ruvalcaba is under my professional care. Uyen was seen in my office on 5/28/2024. Her partner, Jose Miguel, attended the visit with her.     If you have any questions or concerns, please don't hesitate to call.         Sincerely,          Omid Macias MD

## 2024-05-29 ENCOUNTER — TELEPHONE (OUTPATIENT)
Dept: OBGYN CLINIC | Facility: CLINIC | Age: 44
End: 2024-05-29

## 2024-05-29 NOTE — TELEPHONE ENCOUNTER
From: Merced Huizar   Sent: 2024  11:13 AM EDT   To: Kendy Marquez; Vickie Wall, RN; *   Subject: Scheduling pregnancy termination                 Antonio Larry    This patient has a pregnancy with trisomy 21 and would like to schedule a pregnancy termination. Her FISH results are in the Media tab of her chart. I am cc'ing Dr. Alamo as she saw the patient on 24 and Dr. Anderson as she was scheduled to see her on 2024.     Please let me know if there is any other information I can provide that would be helpful.     Best,   Merced Huizar    genetic counselor

## 2024-05-29 NOTE — TELEPHONE ENCOUNTER
cell phone for patient to call back, patient will need a virtual visit with Dr. Anderson prior to her 6/4/2024 [facility] appointment and surgery hold date of 6/5/2024. 24 hour consents need to be signed.  Dr. Anderson will call the patient tomorrow at 12 pm for her vv.

## 2024-05-29 NOTE — TELEPHONE ENCOUNTER
Tried calling patient this afternoon , Direct phone number was left so patient can reach me so I can go over appointments needed and surgery info prior to scheduling her surgical procedure. Dr. Anderson would like a VV with the patient tomorrow 5/30/2024 at 12 pm. Waiting a call back from patient to see if she can do VV.

## 2024-05-30 ENCOUNTER — TELEPHONE (OUTPATIENT)
Dept: OBGYN CLINIC | Facility: CLINIC | Age: 44
End: 2024-05-30

## 2024-05-30 NOTE — TELEPHONE ENCOUNTER
Telephone call:     She was counseled on her options for management which include continuation of the pregnancy, labor induction and dilation and evacuation.  She wishes to proceed with dilation and evacuation.      She was counseled regarding the intraoperative procedure as well as the post-operative expectations.  She understands the risks of surgery including but not limited to bleeding, transfusion, infection, uterine perforation, injury to surrounding organs including bowel/bladder, laparoscopy and/or laparotomy if injury occurs.  All questions were answered, and consent was signed.      Advised to stop ASA today.     She was counseled regarding the state's required waiting period of 24 hours.  She is aware of the gestational age she will be at the procedure and that I will be performing the procedure.  She is aware that if she does not wish to terminate the pregnancy that she could continue the pregnancy, put the baby up for adoption or place the baby in foster care.  She is aware that the [facility] provides information regarding the stages of fetal development for women undergoing termination.  She was offered this information and declined to see it.  She was also informed that the father of the baby would be required to pay child support even if he has offered to pay for the termination.  She states understanding of all of the above but wishes to proceed with the termination.      Patient to return to office on 6/4/2024 for laminaria insertion.

## 2024-05-31 ENCOUNTER — APPOINTMENT (OUTPATIENT)
Dept: LAB | Age: 44
End: 2024-05-31
Payer: COMMERCIAL

## 2024-05-31 ENCOUNTER — TELEPHONE (OUTPATIENT)
Dept: OBGYN CLINIC | Facility: CLINIC | Age: 44
End: 2024-05-31

## 2024-05-31 DIAGNOSIS — Z01.818 PRE-OP TESTING: ICD-10-CM

## 2024-05-31 LAB
ABO GROUP BLD: NORMAL
BLD GP AB SCN SERPL QL: NEGATIVE
ERYTHROCYTE [DISTWIDTH] IN BLOOD BY AUTOMATED COUNT: 13.6 % (ref 11.6–15.1)
HCT VFR BLD AUTO: 39.7 % (ref 34.8–46.1)
HGB BLD-MCNC: 13.1 G/DL (ref 11.5–15.4)
MCH RBC QN AUTO: 29.7 PG (ref 26.8–34.3)
MCHC RBC AUTO-ENTMCNC: 33 G/DL (ref 31.4–37.4)
MCV RBC AUTO: 90 FL (ref 82–98)
PLATELET # BLD AUTO: 245 THOUSANDS/UL (ref 149–390)
PMV BLD AUTO: 10.7 FL (ref 8.9–12.7)
RBC # BLD AUTO: 4.41 MILLION/UL (ref 3.81–5.12)
RH BLD: POSITIVE
SPECIMEN EXPIRATION DATE: NORMAL
WBC # BLD AUTO: 11.99 THOUSAND/UL (ref 4.31–10.16)

## 2024-05-31 PROCEDURE — 85027 COMPLETE CBC AUTOMATED: CPT

## 2024-05-31 PROCEDURE — 86900 BLOOD TYPING SEROLOGIC ABO: CPT

## 2024-05-31 PROCEDURE — 36415 COLL VENOUS BLD VENIPUNCTURE: CPT

## 2024-05-31 PROCEDURE — 86901 BLOOD TYPING SEROLOGIC RH(D): CPT

## 2024-05-31 PROCEDURE — 86850 RBC ANTIBODY SCREEN: CPT

## 2024-05-31 NOTE — TELEPHONE ENCOUNTER
Patient is scheduled for her surgical procedure on 6/5/2024 with Dr. Anderson at the [facility].

## 2024-05-31 NOTE — TELEPHONE ENCOUNTER
----- Message from Saumya Anderson MD sent at 2024  9:35 AM EDT -----  Regarding: surgery scheduling  [facility] GYN Department  Surgery Scheduling Sheet    Patient Name: Uyen Ruvalcaba  : 1980    Provider: Saumya Anderson MD     Needed: yes; Language: N/A    Procedure: exam under anesthesia and dilation and evacuation    Diagnosis: Trisomy 21    Special Needs or Equipment: n/a    Anesthesia: General anesthesia    Length of stay: outpatient  Does patient have comorbid conditions that will require close perioperative monitoring prior to safe discharge: no    The patient has comorbid conditions that will require close perioperative monitoring prior to safe discharge, including N/A.   This may require acute care beyond the usual and routine recovery period. As such, inpatient admission post-operatively is expected and appropriate, and anticipated hospital length of stay will be >2 midnights.    Pre-Admission Testing Needed: no   Labs that should be ordered: cbc and type and screen    Order PAT that is recommended in prep for procedure?: Yes    Medical Clearance Needed: no; Provider: N/A    MA Form Signed (tubals/hysterectomy): Not Indicated    Surgical Drink Given: no     How many days out of work: 2 week(s)     How many days no drivin day(s)       Is pre op appt needed?  no  Interval for post op appt: 2 week(s)     For Surgical Scheduler:     Surgery Scheduled On:  [place]: [facility]    Pre-op Appt:   Post op Appt:  Consult/Medical clearance appt:

## 2024-06-03 ENCOUNTER — TELEPHONE (OUTPATIENT)
Facility: HOSPITAL | Age: 44
End: 2024-06-03

## 2024-06-03 RX ORDER — AMOXICILLIN 500 MG
CAPSULE ORAL DAILY
COMMUNITY

## 2024-06-03 NOTE — PRE-PROCEDURE INSTRUCTIONS
Pre-Surgery Instructions:   Medication Instructions    Omega-3 Fatty Acids (Fish Oil) 1200 MG CAPS Stop taking 7 days prior to surgery.    Prenatal Vit-Fe Fumarate-FA (SM Prenatal Vitamins) 28-0.8 MG TABS Stop taking 7 days prior to surgery.    Medication instructions for day surgery reviewed. Please use only a sip of water to take your instructed medications. Avoid all over the counter vitamins, supplements and NSAIDS for one week prior to surgery per anesthesia guidelines. Tylenol is ok to take as needed.     You will receive a call one business day prior to surgery with an arrival time and hospital directions. If your surgery is scheduled on a Monday, the hospital will be calling you on the Friday prior to your surgery. If you have not heard from anyone by 8pm, please call the hospital supervisor through the hospital  at [phone]. ([place] [phone] or [place] [phone]).    Do not eat or drink anything after midnight the night before your surgery, including candy, mints, lifesavers, or chewing gum. Do not drink alcohol 24hrs before your surgery. Try not to smoke at least 24hrs before your surgery.       Follow the pre surgery showering instructions as listed in the “My Surgical Experience Booklet” or otherwise provided by your surgeon's office. Do not use a blade to shave the surgical area 1 week before surgery. It is okay to use a clean electric clippers up to 24 hours before surgery. Do not apply any lotions, creams, including makeup, cologne, deodorant, or perfumes after showering on the day of your surgery. Do not use dry shampoo, hair spray, hair gel, or any type of hair products.     No contact lenses, eye make-up, or artificial eyelashes. Remove nail polish, including gel polish, and any artificial, gel, or acrylic nails if possible. Remove all jewelry including rings and body piercing jewelry.     Wear causal clothing that is easy to take on and off. Consider your type of  surgery.    Keep any valuables, jewelry, piercings at home. Please bring any specially ordered equipment (sling, braces) if indicated.    Arrange for a responsible person to drive you to and from the hospital on the day of your surgery. Please confirm the visitor policy for the day of your procedure when you receive your phone call with an arrival time.     Call the surgeon's office with any new illnesses, exposures, or additional questions prior to surgery.    Please reference your “My Surgical Experience Booklet” for additional information to prepare for your upcoming surgery.  Pt instructed to stop nsaids and supplements prior to surgery. Pt verbalized understanding of shower and med instructions.

## 2024-06-03 NOTE — TELEPHONE ENCOUNTER
I left a message for Uyen about the chromosome analysis results from her amniocentesis procedure, which confirmed the diagnosis of fetal trisomy 21/Down syndrome in all cells. No chromosomal translocations were observed, so the recurrence risk in future pregnancies is not expected to be greater than the average age-related risk. I am available if Uyen would like to discuss these results further, or if there is any other way I can be helpful. Provided callback number and I will send a [facility] message so she can reach out that way if she prefers.    Merced Huizar, CGC

## 2024-06-04 ENCOUNTER — PROCEDURE VISIT (OUTPATIENT)
Dept: OBGYN CLINIC | Facility: CLINIC | Age: 44
End: 2024-06-04
Payer: COMMERCIAL

## 2024-06-04 ENCOUNTER — TELEPHONE (OUTPATIENT)
Age: 44
End: 2024-06-04

## 2024-06-04 VITALS
SYSTOLIC BLOOD PRESSURE: 124 MMHG | BODY MASS INDEX: 28.22 KG/M2 | DIASTOLIC BLOOD PRESSURE: 70 MMHG | HEIGHT: 66 IN | WEIGHT: 175.6 LBS

## 2024-06-04 DIAGNOSIS — Q90.9 TRISOMY 21: Primary | ICD-10-CM

## 2024-06-04 DIAGNOSIS — O28.0 ABNORMAL MATERNAL SERUM SCREENING TEST: ICD-10-CM

## 2024-06-04 PROCEDURE — 59200 INSERT CERVICAL DILATOR: CPT | Performed by: OBSTETRICS & GYNECOLOGY

## 2024-06-04 PROCEDURE — 99214 OFFICE O/P EST MOD 30 MIN: CPT | Performed by: OBSTETRICS & GYNECOLOGY

## 2024-06-04 RX ORDER — DOXYCYCLINE 100 MG/1
100 TABLET ORAL 2 TIMES DAILY
Qty: 2 TABLET | Refills: 0 | Status: SHIPPED | OUTPATIENT
Start: 2024-06-04 | End: 2024-06-05

## 2024-06-04 NOTE — TELEPHONE ENCOUNTER
Pt calling to clarify directions for taking doxycycline prior to tomorrow's procedure. RN reviewed instructions. Pt verbalized an understanding. No further questions.

## 2024-06-04 NOTE — PROGRESS NOTES
A/P: Pt is 44 y.o. yo female with Trisomy 21 for dilation and evacuation at 18 wks.     Surgery scheduled for tomorrow.   Rx doxycycline sent.    S: Patient is 44 y.o.  at 18 4/7 wks gestation with Trisomty 21.  She was referred by [facility] for management of this pregnancy with dilation and evacuation.  She underwent [id number] for genetic testing which showed an elevated risk for Trisomy 21.  Her ultrasound also showed multiple anomalies including but not limited to shortened long bones, an EIF and a possible AV canal defect.     She was counseled on her options for management which include continuation of the pregnancy, labor induction and dilation and evacuation.  She wishes to proceed with dilation and evacuation.      She was counseled regarding the intraoperative procedure as well as the post-operative expectations.  She understands the risks of surgery including but not limited to bleeding, transfusion, infection, uterine perforation, injury to surrounding organs including bowel/bladder, laparoscopy and/or laparotomy if injury occurs.  All questions were answered, and consent was given via phone on 2024.  She reaffirmed and signed her consent today.      She was counseled regarding the state's required waiting period of 24 hours via phone on 24.  She is aware of the gestational age she will be at the procedure and that I will be performing the procedure.  She is aware that if she does not wish to terminate the pregnancy that she could continue the pregnancy, put the baby up for adoption or place the baby in foster care.  She is aware that the [facility] provides information regarding the stages of fetal development for women undergoing termination.  She was offered this information and declined to see it.  She was also informed that the father of the baby would be required to pay child support even if he has offered to pay for the termination.  She states understanding of all of the above but  wishes to proceed with the termination.  She signed the acknowledgement of that conversation today.    She was counseled about her options for disposition.  She opts for hospital disposition.      Past Medical History:   Diagnosis Date    Abnormal Pap smear of cervix     approx 2008    C5 vertebral fracture (HCC)     Last Assessed:2/1/2016    Cyst of ovary, right 11/07/2007    HPV (human papilloma virus) infection     Substance abuse (HCC)     hx of cocaine use- 20 yrs ago    Varicella     chickenpox - childhood     Past Surgical History:   Procedure Laterality Date    COLPOSCOPY  01/08/2019    neg    DILATION AND CURETTAGE OF UTERUS      IAB    NECK SURGERY  08/05/2015    1.Anterior C5 corpectomy with greater than 80% of the verebral body removed2.C4-C5,C5-C6(2 level)ianterior cervical discectomy3.Placement of -C6(2 level) interbody PEEK cage for 2 level interbody fusion4.C4-C6(2 level) anterior titanium plate and screw instrumentation for anterior cervical fusion5.Use of microscopy for microdissection/corpectomy6.Harvesting of autologous bone for                       Social History   Current Outpatient Medications on File Prior to Visit   Medication Sig Dispense Refill    Prenatal Vit-Fe Fumarate-FA ( Prenatal Vitamins) 28-0.8 MG TABS Take by mouth in the morning      Omega-3 Fatty Acids (Fish Oil) 1200 MG CAPS Take by mouth in the morning (Patient not taking: Reported on 6/4/2024)       No current facility-administered medications on file prior to visit.     No Known Allergies    O:   Vitals:    06/04/24 1538   BP: 124/70       Procedure:  Laminaria insertion procedure was explained to the patient and she gave verbal consent for the procedure. She was placed in the dorsal lithotomy position. A speculum was inserted with good visualization of the cervix.  The cervix was cleansed with Betadine.  The anterior lip of the cervix was grasped with a single-tooth tenaculum.  A paracervical block of 2% lidocaine  without epinephrine was placed in divided doses at the 5 and 7 o'clock positions for a total of 10 cc. 6XL laminaria were inserted into the cervix without difficulty. Tenaculum was removed from the anterior lip of the cervix.  Two saline soaked gauze sponges were placed into the vagina.  The speculum was removed. The patient tolerated the procedure well.

## 2024-06-04 NOTE — H&P (VIEW-ONLY)
A/P: Pt is 44 y.o. yo female with Trisomy 21 for dilation and evacuation at 18 wks.     Surgery scheduled for tomorrow.   Rx doxycycline sent.    S: Patient is 44 y.o.  at 18 4/7 wks gestation with Trisomty 21.  She was referred by [facility] for management of this pregnancy with dilation and evacuation.  She underwent [id number] for genetic testing which showed an elevated risk for Trisomy 21.  Her ultrasound also showed multiple anomalies including but not limited to shortened long bones, an EIF and a possible AV canal defect.     She was counseled on her options for management which include continuation of the pregnancy, labor induction and dilation and evacuation.  She wishes to proceed with dilation and evacuation.      She was counseled regarding the intraoperative procedure as well as the post-operative expectations.  She understands the risks of surgery including but not limited to bleeding, transfusion, infection, uterine perforation, injury to surrounding organs including bowel/bladder, laparoscopy and/or laparotomy if injury occurs.  All questions were answered, and consent was given via phone on 2024.  She reaffirmed and signed her consent today.      She was counseled regarding the state's required waiting period of 24 hours via phone on 24.  She is aware of the gestational age she will be at the procedure and that I will be performing the procedure.  She is aware that if she does not wish to terminate the pregnancy that she could continue the pregnancy, put the baby up for adoption or place the baby in foster care.  She is aware that the [facility] provides information regarding the stages of fetal development for women undergoing termination.  She was offered this information and declined to see it.  She was also informed that the father of the baby would be required to pay child support even if he has offered to pay for the termination.  She states understanding of all of the above but  wishes to proceed with the termination.  She signed the acknowledgement of that conversation today.    She was counseled about her options for disposition.  She opts for hospital disposition.      Past Medical History:   Diagnosis Date    Abnormal Pap smear of cervix     approx 2008    C5 vertebral fracture (HCC)     Last Assessed:2/1/2016    Cyst of ovary, right 11/07/2007    HPV (human papilloma virus) infection     Substance abuse (HCC)     hx of cocaine use- 20 yrs ago    Varicella     chickenpox - childhood     Past Surgical History:   Procedure Laterality Date    COLPOSCOPY  01/08/2019    neg    DILATION AND CURETTAGE OF UTERUS      IAB    NECK SURGERY  08/05/2015    1.Anterior C5 corpectomy with greater than 80% of the verebral body removed2.C4-C5,C5-C6(2 level)ianterior cervical discectomy3.Placement of -C6(2 level) interbody PEEK cage for 2 level interbody fusion4.C4-C6(2 level) anterior titanium plate and screw instrumentation for anterior cervical fusion5.Use of microscopy for microdissection/corpectomy6.Harvesting of autologous bone for                       Social History   Current Outpatient Medications on File Prior to Visit   Medication Sig Dispense Refill    Prenatal Vit-Fe Fumarate-FA ( Prenatal Vitamins) 28-0.8 MG TABS Take by mouth in the morning      Omega-3 Fatty Acids (Fish Oil) 1200 MG CAPS Take by mouth in the morning (Patient not taking: Reported on 6/4/2024)       No current facility-administered medications on file prior to visit.     No Known Allergies    O:   Vitals:    06/04/24 1538   BP: 124/70       Procedure:  Laminaria insertion procedure was explained to the patient and she gave verbal consent for the procedure. She was placed in the dorsal lithotomy position. A speculum was inserted with good visualization of the cervix.  The cervix was cleansed with Betadine.  The anterior lip of the cervix was grasped with a single-tooth tenaculum.  A paracervical block of 2% lidocaine  without epinephrine was placed in divided doses at the 5 and 7 o'clock positions for a total of 10 cc. 6XL laminaria were inserted into the cervix without difficulty. Tenaculum was removed from the anterior lip of the cervix.  Two saline soaked gauze sponges were placed into the vagina.  The speculum was removed. The patient tolerated the procedure well.

## 2024-06-05 ENCOUNTER — HOSPITAL ENCOUNTER (OUTPATIENT)
Facility: HOSPITAL | Age: 44
Setting detail: OUTPATIENT SURGERY
Discharge: HOME | End: 2024-06-05
Attending: OBSTETRICS & GYNECOLOGY | Admitting: OBSTETRICS & GYNECOLOGY
Payer: COMMERCIAL

## 2024-06-05 ENCOUNTER — ANESTHESIA EVENT (OUTPATIENT)
Dept: PERIOP | Facility: HOSPITAL | Age: 44
End: 2024-06-05
Payer: COMMERCIAL

## 2024-06-05 ENCOUNTER — ANESTHESIA (OUTPATIENT)
Dept: PERIOP | Facility: HOSPITAL | Age: 44
End: 2024-06-05
Payer: COMMERCIAL

## 2024-06-05 VITALS
HEART RATE: 75 BPM | WEIGHT: 175 LBS | TEMPERATURE: 97.8 F | RESPIRATION RATE: 16 BRPM | DIASTOLIC BLOOD PRESSURE: 71 MMHG | SYSTOLIC BLOOD PRESSURE: 116 MMHG | OXYGEN SATURATION: 96 % | BODY MASS INDEX: 29.16 KG/M2 | HEIGHT: 65 IN

## 2024-06-05 DIAGNOSIS — O35.9XX0 FETAL ABNORMALITY AFFECTING MANAGEMENT OF MOTHER, SINGLE OR UNSPECIFIED FETUS: ICD-10-CM

## 2024-06-05 DIAGNOSIS — Q90.9 TRISOMY 21: ICD-10-CM

## 2024-06-05 PROBLEM — Z98.890 S/P DILATION AND CURETTAGE: Status: ACTIVE | Noted: 2024-06-05

## 2024-06-05 LAB
ABO GROUP BLD: NORMAL
BLD GP AB SCN SERPL QL: NEGATIVE
RH BLD: POSITIVE
SPECIMEN EXPIRATION DATE: NORMAL

## 2024-06-05 PROCEDURE — 86900 BLOOD TYPING SEROLOGIC ABO: CPT | Performed by: OBSTETRICS & GYNECOLOGY

## 2024-06-05 PROCEDURE — 59841 INDUCED ABORTION DILAT&EVAC: CPT | Performed by: OBSTETRICS & GYNECOLOGY

## 2024-06-05 PROCEDURE — 86850 RBC ANTIBODY SCREEN: CPT | Performed by: OBSTETRICS & GYNECOLOGY

## 2024-06-05 PROCEDURE — 86901 BLOOD TYPING SEROLOGIC RH(D): CPT | Performed by: OBSTETRICS & GYNECOLOGY

## 2024-06-05 PROCEDURE — 88305 TISSUE EXAM BY PATHOLOGIST: CPT | Performed by: PATHOLOGY

## 2024-06-05 RX ORDER — GLYCOPYRROLATE 0.2 MG/ML
INJECTION INTRAMUSCULAR; INTRAVENOUS AS NEEDED
Status: DISCONTINUED | OUTPATIENT
Start: 2024-06-05 | End: 2024-06-05

## 2024-06-05 RX ORDER — PROPOFOL 10 MG/ML
INJECTION, EMULSION INTRAVENOUS AS NEEDED
Status: DISCONTINUED | OUTPATIENT
Start: 2024-06-05 | End: 2024-06-05

## 2024-06-05 RX ORDER — PROMETHAZINE HYDROCHLORIDE 25 MG/ML
25 INJECTION, SOLUTION INTRAMUSCULAR; INTRAVENOUS ONCE AS NEEDED
Status: DISCONTINUED | OUTPATIENT
Start: 2024-06-05 | End: 2024-06-05 | Stop reason: HOSPADM

## 2024-06-05 RX ORDER — MIDAZOLAM HYDROCHLORIDE 2 MG/2ML
INJECTION, SOLUTION INTRAMUSCULAR; INTRAVENOUS AS NEEDED
Status: DISCONTINUED | OUTPATIENT
Start: 2024-06-05 | End: 2024-06-05

## 2024-06-05 RX ORDER — DEXAMETHASONE SODIUM PHOSPHATE 10 MG/ML
INJECTION, SOLUTION INTRAMUSCULAR; INTRAVENOUS AS NEEDED
Status: DISCONTINUED | OUTPATIENT
Start: 2024-06-05 | End: 2024-06-05

## 2024-06-05 RX ORDER — HYDROMORPHONE HCL/PF 1 MG/ML
0.5 SYRINGE (ML) INJECTION
Status: DISCONTINUED | OUTPATIENT
Start: 2024-06-05 | End: 2024-06-05 | Stop reason: HOSPADM

## 2024-06-05 RX ORDER — FENTANYL CITRATE 50 UG/ML
INJECTION, SOLUTION INTRAMUSCULAR; INTRAVENOUS AS NEEDED
Status: DISCONTINUED | OUTPATIENT
Start: 2024-06-05 | End: 2024-06-05

## 2024-06-05 RX ORDER — ONDANSETRON 2 MG/ML
4 INJECTION INTRAMUSCULAR; INTRAVENOUS EVERY 6 HOURS PRN
Status: DISCONTINUED | OUTPATIENT
Start: 2024-06-05 | End: 2024-06-05 | Stop reason: HOSPADM

## 2024-06-05 RX ORDER — DOXYCYCLINE HYCLATE 100 MG/1
200 CAPSULE ORAL ONCE
Status: COMPLETED | OUTPATIENT
Start: 2024-06-05 | End: 2024-06-05

## 2024-06-05 RX ORDER — METHYLERGONOVINE MALEATE 0.2 MG/ML
INJECTION INTRAVENOUS AS NEEDED
Status: DISCONTINUED | OUTPATIENT
Start: 2024-06-05 | End: 2024-06-05

## 2024-06-05 RX ORDER — ESMOLOL HYDROCHLORIDE 10 MG/ML
INJECTION INTRAVENOUS AS NEEDED
Status: DISCONTINUED | OUTPATIENT
Start: 2024-06-05 | End: 2024-06-05

## 2024-06-05 RX ORDER — IBUPROFEN 200 MG
800 TABLET ORAL EVERY 6 HOURS PRN
COMMUNITY
Start: 2024-06-04

## 2024-06-05 RX ORDER — FENTANYL CITRATE/PF 50 MCG/ML
25 SYRINGE (ML) INJECTION
Status: DISCONTINUED | OUTPATIENT
Start: 2024-06-05 | End: 2024-06-05 | Stop reason: HOSPADM

## 2024-06-05 RX ORDER — SODIUM CHLORIDE, SODIUM LACTATE, POTASSIUM CHLORIDE, CALCIUM CHLORIDE 600; 310; 30; 20 MG/100ML; MG/100ML; MG/100ML; MG/100ML
INJECTION, SOLUTION INTRAVENOUS CONTINUOUS PRN
Status: DISCONTINUED | OUTPATIENT
Start: 2024-06-05 | End: 2024-06-05

## 2024-06-05 RX ORDER — MISOPROSTOL 200 UG/1
400 TABLET ORAL ONCE
Status: COMPLETED | OUTPATIENT
Start: 2024-06-05 | End: 2024-06-05

## 2024-06-05 RX ORDER — ACETAMINOPHEN 325 MG/1
975 TABLET ORAL EVERY 6 HOURS PRN
Status: DISCONTINUED | OUTPATIENT
Start: 2024-06-05 | End: 2024-06-05 | Stop reason: HOSPADM

## 2024-06-05 RX ORDER — ONDANSETRON 2 MG/ML
INJECTION INTRAMUSCULAR; INTRAVENOUS AS NEEDED
Status: DISCONTINUED | OUTPATIENT
Start: 2024-06-05 | End: 2024-06-05

## 2024-06-05 RX ORDER — MEPERIDINE HYDROCHLORIDE 25 MG/ML
12.5 INJECTION INTRAMUSCULAR; INTRAVENOUS; SUBCUTANEOUS
Status: DISCONTINUED | OUTPATIENT
Start: 2024-06-05 | End: 2024-06-05 | Stop reason: HOSPADM

## 2024-06-05 RX ORDER — LIDOCAINE HYDROCHLORIDE 10 MG/ML
INJECTION, SOLUTION EPIDURAL; INFILTRATION; INTRACAUDAL; PERINEURAL AS NEEDED
Status: DISCONTINUED | OUTPATIENT
Start: 2024-06-05 | End: 2024-06-05

## 2024-06-05 RX ORDER — IBUPROFEN 600 MG/1
600 TABLET ORAL EVERY 6 HOURS PRN
Status: DISCONTINUED | OUTPATIENT
Start: 2024-06-05 | End: 2024-06-05 | Stop reason: HOSPADM

## 2024-06-05 RX ORDER — ONDANSETRON 2 MG/ML
4 INJECTION INTRAMUSCULAR; INTRAVENOUS ONCE AS NEEDED
Status: DISCONTINUED | OUTPATIENT
Start: 2024-06-05 | End: 2024-06-05 | Stop reason: HOSPADM

## 2024-06-05 RX ORDER — SODIUM CHLORIDE, SODIUM LACTATE, POTASSIUM CHLORIDE, CALCIUM CHLORIDE 600; 310; 30; 20 MG/100ML; MG/100ML; MG/100ML; MG/100ML
125 INJECTION, SOLUTION INTRAVENOUS CONTINUOUS
Status: DISCONTINUED | OUTPATIENT
Start: 2024-06-05 | End: 2024-06-05 | Stop reason: HOSPADM

## 2024-06-05 RX ORDER — ROCURONIUM BROMIDE 10 MG/ML
INJECTION, SOLUTION INTRAVENOUS AS NEEDED
Status: DISCONTINUED | OUTPATIENT
Start: 2024-06-05 | End: 2024-06-05

## 2024-06-05 RX ADMIN — PROPOFOL 170 MG: 10 INJECTION, EMULSION INTRAVENOUS at 14:23

## 2024-06-05 RX ADMIN — FENTANYL CITRATE 25 MCG: 50 INJECTION INTRAMUSCULAR; INTRAVENOUS at 15:26

## 2024-06-05 RX ADMIN — GLYCOPYRROLATE 0.1 MG: 0.2 INJECTION INTRAMUSCULAR; INTRAVENOUS at 14:16

## 2024-06-05 RX ADMIN — ESMOLOL HYDROCHLORIDE 30 MG: 10 INJECTION INTRAVENOUS at 14:45

## 2024-06-05 RX ADMIN — DOXYCYCLINE 200 MG: 100 CAPSULE ORAL at 13:53

## 2024-06-05 RX ADMIN — ROCURONIUM BROMIDE 30 MG: 10 INJECTION, SOLUTION INTRAVENOUS at 14:23

## 2024-06-05 RX ADMIN — METHYLERGONOVINE MALEATE 0.2 MG: 0.2 INJECTION INTRAVENOUS at 15:01

## 2024-06-05 RX ADMIN — PROPOFOL 30 MG: 10 INJECTION, EMULSION INTRAVENOUS at 14:53

## 2024-06-05 RX ADMIN — FENTANYL CITRATE 50 MCG: 50 INJECTION, SOLUTION INTRAMUSCULAR; INTRAVENOUS at 14:17

## 2024-06-05 RX ADMIN — SODIUM CHLORIDE, SODIUM LACTATE, POTASSIUM CHLORIDE, AND CALCIUM CHLORIDE: .6; .31; .03; .02 INJECTION, SOLUTION INTRAVENOUS at 13:55

## 2024-06-05 RX ADMIN — FENTANYL CITRATE 25 MCG: 50 INJECTION INTRAMUSCULAR; INTRAVENOUS at 15:43

## 2024-06-05 RX ADMIN — FENTANYL CITRATE 50 MCG: 50 INJECTION, SOLUTION INTRAMUSCULAR; INTRAVENOUS at 14:21

## 2024-06-05 RX ADMIN — ONDANSETRON 4 MG: 2 INJECTION INTRAMUSCULAR; INTRAVENOUS at 14:16

## 2024-06-05 RX ADMIN — ONDANSETRON 4 MG: 2 INJECTION INTRAMUSCULAR; INTRAVENOUS at 14:15

## 2024-06-05 RX ADMIN — MISOPROSTOL 400 MCG: 200 TABLET ORAL at 13:24

## 2024-06-05 RX ADMIN — LIDOCAINE HYDROCHLORIDE 50 MG: 10 INJECTION, SOLUTION EPIDURAL; INFILTRATION; INTRACAUDAL; PERINEURAL at 14:23

## 2024-06-05 RX ADMIN — ESMOLOL HYDROCHLORIDE 20 MG: 10 INJECTION INTRAVENOUS at 14:48

## 2024-06-05 RX ADMIN — MIDAZOLAM HYDROCHLORIDE 2 MG: 2 INJECTION, SOLUTION INTRAMUSCULAR; INTRAVENOUS at 14:15

## 2024-06-05 NOTE — ANESTHESIA PREPROCEDURE EVALUATION
Procedure:  DILATATION AND EVACUATION 18 WEEKS, EXAM UNDER ANESTHESIA (Uterus)    Relevant Problems   ANESTHESIA (within normal limits)      GYN   (+) 17 weeks gestation of pregnancy   (+) Multigravida of advanced maternal age in first trimester      Orthopedic/Musculoskeletal   (+) C5 vertebral fracture (HCC)        Physical Exam    Airway    Mallampati score: II  TM Distance: <3 FB  Neck ROM: full     Dental   No notable dental hx     Cardiovascular      Pulmonary      Other Findings  post-pubertal.      Anesthesia Plan  ASA Score- 2     Anesthesia Type- general with ASA Monitors.         Additional Monitors:     Airway Plan: ETT.           Plan Factors-Exercise tolerance (METS): >4 METS.    Chart reviewed. EKG reviewed.  Existing labs reviewed. Patient summary reviewed.    Patient is not a current smoker.              Induction- intravenous.    Postoperative Plan- Plan for postoperative opioid use.         Informed Consent- Anesthetic plan and risks discussed with patient.  I personally reviewed this patient with the CRNA. Discussed and agreed on the Anesthesia Plan with the CRNA..

## 2024-06-05 NOTE — INTERVAL H&P NOTE
H&P reviewed. After examining the patient I find no changes in the patients condition since the H&P had been written.    Vitals:    06/05/24 1310   BP: 112/61   Pulse: 88   Resp: 18   Temp: 97.9 °F (36.6 °C)   SpO2: 95%     Head: normocephalic, atraumatic  CV: regular rate and rhythm  Lungs: clear to auscultation b/l  Abdomen: soft, nontender, gravid  Extremities: no pain/swelling b/l LE

## 2024-06-05 NOTE — OP NOTE
OPERATIVE REPORT  PATIENT NAME: Uyen Ruvalcaba    :  1980  MRN: [id number]  Pt Location: [facility] OR ROOM 06    SURGERY DATE: 2024    Surgeons and Role:     * Saumya Anderson MD - Primary     * Camryn Jenkins MD - Assisting    Preop Diagnosis:  Fetal abnormality affecting management of mother, single or unspecified fetus [O35.9XX0]  Trisomy 21 [Q90.9]    Post-Op Diagnosis Codes:     * Fetal abnormality affecting management of mother, single or unspecified fetus [O35.9XX0]     * Trisomy 21 [Q90.9]    Procedure(s):  DILATATION AND EVACUATION 18 WEEKS. EXAM UNDER ANESTHESIA    Specimen(s):  ID Type Source Tests Collected by Time Destination   1 : gestational weeks= 18 weeks Tissue Products of Conception TISSUE EXAM Saumya Anderson MD 2024 1432        Estimated Blood Loss:   200 mL     Drains:  Straight cath 200 mL     IV Fluids:    mL     Anesthesia Type:   General, ET     Operative Indications:  Fetal abnormality affecting management of mother, single or unspecified fetus [O35.9XX0]  Trisomy 21 [Q90.9]    Operative Findings:  Grossly normal appearing external genitalia   6 laminaria and 2 sponges removed from the cervix and vagina   Cervix approximately 2 cm dilated after removal of laminaria   Completely evacuated uterus with thin endometrial strip on TAUS     Complications:   None Apparent     Procedure and Technique:  The patient was taken to the operating room where she was properly identified. She was placed under general endotracheal anesthesia without difficulty. She was prepped and draped in the normal sterile fashion in the dorsal lithotomy position using the stirrups. Care was taken to avoid excessive flexion or extension of the patients hips and knees or pressure on her extremities. A time out was performed and everyone was in agreement. The patient received PO doxycyline preoperatively.     The bladder was drained with a straight cath for 200 of clear urine. Laminaria were  removed and the cervix was found to be 2 cm dilated.    A double sided speculum was placed in the patient's vagina and the anterior lip of the cervix was grasped with a single tooth tenaculum. The uterus was then evacuated under ultrasound guidance. All fetal parts were counted and identified.    The uterus was felt to clamp down. Methergine was given to aid in uterine contractility. All instruments were removed from the patient's vagina.     The patient tolerated the procedure well. Sponge, instrument and needle counts were correct times 2. The patient was awakened from anesthesia and taken to the recovery room in stable condition.      Patient Disposition:  PACU         SIGNATURE: Camryn Jenkins MD  DATE: June 5, 2024  TIME: 3:39 PM

## 2024-06-05 NOTE — ANESTHESIA POSTPROCEDURE EVALUATION
Post-Op Assessment Note    CV Status:  Stable    Pain management: adequate       Mental Status:  Sleepy and arousable   Hydration Status:  Euvolemic   PONV Controlled:  Controlled   Airway Patency:  Patent     Post Op Vitals Reviewed: Yes    No anethesia notable event occurred.    Staff: Anesthesiologist               /74 (06/05/24 1515)    Temp 97.9 °F (36.6 °C) (06/05/24 1515)    Pulse 83 (06/05/24 1515)   Resp 15 (06/05/24 1515)    SpO2 99 % (06/05/24 1515)

## 2024-06-10 PROCEDURE — 88305 TISSUE EXAM BY PATHOLOGIST: CPT | Performed by: PATHOLOGY

## 2024-06-12 LAB
ACHE AMN-CCNC: NEGATIVE U/L
ACHE AMN-IMP: NORMAL
AFP ADJ MOM AMN: 0.67
AFP AMN-MCNC: 9 UG/ML
AFP AMN-MCNC: NORMAL UG/L
CELLS ANALYZED AMN: 15
CELLS ANALYZED AMN: 50
CELLS COUNTED AMN: 15
CELLS COUNTED AMN: 50
CELLS KARYOTYPED.TOTAL AMN: 2
CHROM ANALY INTERPHASE AMN FISH-IMP: NORMAL
CHROM ANALY OVERALL INTERP-IMP: NORMAL
CHROM ANALY RESULT (ISCN): NORMAL
CLINICAL CYTOGENETICIST SPEC: NORMAL
COLONIES COUNTED AMN: 15
FETAL HEMOGLOBIN TESTING: NORMAL
GA (WEEKS): 16 WK
GENE XXX MUT ANL BLD/T: NORMAL
INTERPRETATION: NORMAL
ISCN BAND LEVEL AMN QL: 450
KARYOTYP AMN: NORMAL
LAB DIRECTOR NAME PROVIDER: NORMAL
SL AMB SPECIMEN TYPE: NORMAL
SPECIMEN PREPARATION: NORMAL
SPECIMEN SOURCE: NORMAL

## 2024-06-14 ENCOUNTER — TELEMEDICINE (OUTPATIENT)
Facility: HOSPITAL | Age: 44
End: 2024-06-14

## 2024-06-14 ENCOUNTER — TELEPHONE (OUTPATIENT)
Facility: HOSPITAL | Age: 44
End: 2024-06-14

## 2024-06-14 DIAGNOSIS — Z31.5 ENCOUNTER FOR PROCREATIVE GENETIC COUNSELING: Primary | ICD-10-CM

## 2024-06-14 PROCEDURE — NC001 PR NO CHARGE

## 2024-06-14 NOTE — TELEPHONE ENCOUNTER
"I called Uyen at [phone] to check in after her pregnancy termination last week due to fetal trisomy 21. She reports that physically she is doing well, and she has lots of social support from people in her life. She declined the offer of [facility] support resources as she has a lot of support and is already seeing a therapist.     We discussed the chance to have another pregnancy with trisomy 21/Down syndrome. Uyen has done some research on her own and is familiar with the numbers, but is also cognizant that there is at least a 95% chance that any future pregnancy would not be affected. Uyen that her partner Jose Miguel is concerned about the potentially high risk, focusing on the 96% positive predictive value (PPV) of her positive NIPS test. We discussed that the PPV is based on the overall likelihood of the condition being present, so it is in part related to age. While another positive result for the same condition would likely have an equal or greater PPV, this is a reflection of the accuracy of the test rather than a near-100% chance to have a pregnancy with trisomy 21. In a chromosomally typical pregnancy, the test result would most likely be \"low risk\" and quote a less than 1% chance of trisomy 21 being present. We reviewed that should she choose to become pregnant again, NIPS may be performed at an earlier gestational age - as early as 10 weeks - to either provide reassurance or advance notice of a potential issue.     Uyen had some questions about the findings of her early anatomy ultrasound. The fetal long bones were observed to be short (<1%) and measuring about two weeks behind. We discussed that this is sometimes seen in pregnancies with Down syndrome and is considered a \"soft marker\". The patient also asked about the fetal head being small, as she believes it was about one week behind; I do not recall if this was the case and could not provide further insight. We discussed the general difficulty of " visualizing fetal anomalies at an early gestational age.    Uyen reports that she is considering pursuing IVF due to the ability to screen embryos for aneuploidy via PGT-A. We discussed that this is a fairly effective way to reduce the risk of aneuploidy in a future pregnancy, if desired. However, we reviewed that just as the risk for aneuploidy in pregnancy is higher in the first trimester than at birth, the risk is even higher in embryos than in continuing pregnancies. It is possible that someone could have one or more egg retrieval cycles and have very few euploid embryos. I recommended that she speak with fertility specialists if she wants the most accurate and comprehensive information on the IVF process and the expected success thereof.    Uyen had no further questions at this time. She is welcome to reach out if she needs anything in the future, and has my contact number.    Merced Huizar, CGC

## 2024-06-18 ENCOUNTER — OFFICE VISIT (OUTPATIENT)
Dept: OBGYN CLINIC | Facility: CLINIC | Age: 44
End: 2024-06-18

## 2024-06-18 VITALS — WEIGHT: 177 LBS | SYSTOLIC BLOOD PRESSURE: 110 MMHG | BODY MASS INDEX: 29.45 KG/M2 | DIASTOLIC BLOOD PRESSURE: 60 MMHG

## 2024-06-18 DIAGNOSIS — Z09 POSTOPERATIVE EXAMINATION: Primary | ICD-10-CM

## 2024-06-18 PROBLEM — Z13.79 GENETIC SCREENING: Status: RESOLVED | Noted: 2024-04-24 | Resolved: 2024-06-18

## 2024-06-18 PROBLEM — Z3A.17 17 WEEKS GESTATION OF PREGNANCY: Status: RESOLVED | Noted: 2024-04-23 | Resolved: 2024-06-18

## 2024-06-18 PROBLEM — Z36.0 ENCOUNTER FOR ANTENATAL SCREENING FOR CHROMOSOMAL ANOMALIES BY AMNIOCENTESIS: Status: RESOLVED | Noted: 2024-05-16 | Resolved: 2024-06-18

## 2024-06-18 PROBLEM — O28.0 ABNORMAL MATERNAL SERUM SCREENING TEST: Status: RESOLVED | Noted: 2024-05-16 | Resolved: 2024-06-18

## 2024-06-18 PROBLEM — O09.519 ADVANCED MATERNAL AGE, PRIMIGRAVIDA, ANTEPARTUM: Status: RESOLVED | Noted: 2024-05-16 | Resolved: 2024-06-18

## 2024-06-18 PROBLEM — O09.521 MULTIGRAVIDA OF ADVANCED MATERNAL AGE IN FIRST TRIMESTER: Status: RESOLVED | Noted: 2024-04-23 | Resolved: 2024-06-18

## 2024-06-18 PROCEDURE — 99024 POSTOP FOLLOW-UP VISIT: CPT | Performed by: OBSTETRICS & GYNECOLOGY

## 2024-06-18 RX ORDER — DIPHENOXYLATE HYDROCHLORIDE AND ATROPINE SULFATE 2.5; .025 MG/1; MG/1
1 TABLET ORAL DAILY
COMMUNITY

## 2024-06-18 NOTE — PROGRESS NOTES
I will see 9/12 at 1145 (3 weeks is ok but can call earlier if not doing well)   Post - Operative Visit    Uyen Ruvalcaba is a 44 y.o. female here for post-operative visit.  She is s/p D+E on 2024.  Her surgery was uncomplicated.  Her post-operative course has been uneventful.  She denies fevers.  She has no bowel or bladder concerns.  She has no vaginal discharge or concerning bleeding.      She is seeing a counselor through work which is helping.  She will call if that is not enough in the future for referral to [facility].    Spoke with genetic counselor and comfortable with the plan for early referral to [facility] in next pregnancy.  Discussed possibility to do [facility] with next pregnancy for earlier definitive diagnosis.      Review of Systems - negative    Past Medical History:   Diagnosis Date    Abnormal Pap smear of cervix     approx     C5 vertebral fracture (HCC)     Last Assessed:2016    Cyst of ovary, right 2007    HPV (human papilloma virus) infection     Substance abuse (HCC)     hx of cocaine use- 20 yrs ago    Varicella     chickenpox - childhood     Past Surgical History:   Procedure Laterality Date    COLPOSCOPY  2019    neg    DILATION AND CURETTAGE OF UTERUS      [facility]    NECK SURGERY  2015    1.Anterior C5 corpectomy with greater than 80% of the verebral body removed2.C4-C5,C5-C6(2 level)ianterior cervical discectomy3.Placement of -C6(2 level) interbody PEEK cage for 2 level interbody fusion4.C4-C6(2 level) anterior titanium plate and screw instrumentation for anterior cervical fusion5.Use of microscopy for microdissection/corpectomy6.Harvesting of autologous bone for                      AL INDUCED  DILATION & EVACUATION N/A 2024    Procedure: DILATATION AND EVACUATION 18 WEEKS, EXAM UNDER ANESTHESIA;  Surgeon: Saumya Anderson MD;  Location: [facility];  Service: Gynecology       Objective:  /60 (BP Location: Right arm, Patient Position: Sitting, Cuff Size: Standard)   Wt 80.3 kg (177 lb)   LMP 2024   Breastfeeding No    BMI 29.45 kg/m²   OBGyn Exam  Deferred as no complaints    A:  44 y.o. s/p D+E  - doing well post-operatively    P:  Return for positive pregnancy test or PRN.

## 2024-06-25 DIAGNOSIS — Z30.41 ENCOUNTER FOR SURVEILLANCE OF CONTRACEPTIVE PILLS: Primary | ICD-10-CM

## 2024-06-25 RX ORDER — ACETAMINOPHEN AND CODEINE PHOSPHATE 120; 12 MG/5ML; MG/5ML
1 SOLUTION ORAL DAILY
Qty: 28 TABLET | Refills: 11 | Status: SHIPPED | OUTPATIENT
Start: 2024-06-25

## 2024-07-15 ENCOUNTER — TELEPHONE (OUTPATIENT)
Age: 44
End: 2024-07-15

## 2024-07-15 NOTE — TELEPHONE ENCOUNTER
Called pt to schedule an appt, I received an online request. Left a v/m to have pt call back to see where and if they still want to make an appt.

## 2024-09-04 ENCOUNTER — OFFICE VISIT (OUTPATIENT)
Dept: INTERNAL MEDICINE CLINIC | Facility: CLINIC | Age: 44
End: 2024-09-04
Payer: COMMERCIAL

## 2024-09-04 VITALS
HEIGHT: 65 IN | BODY MASS INDEX: 28.99 KG/M2 | TEMPERATURE: 98.4 F | OXYGEN SATURATION: 99 % | DIASTOLIC BLOOD PRESSURE: 80 MMHG | HEART RATE: 74 BPM | WEIGHT: 174 LBS | SYSTOLIC BLOOD PRESSURE: 132 MMHG

## 2024-09-04 DIAGNOSIS — Z00.00 ANNUAL PHYSICAL EXAM: Primary | ICD-10-CM

## 2024-09-04 DIAGNOSIS — R73.01 IMPAIRED FASTING BLOOD SUGAR: ICD-10-CM

## 2024-09-04 DIAGNOSIS — E78.2 MIXED HYPERLIPIDEMIA: ICD-10-CM

## 2024-09-04 PROBLEM — Q90.9 TRISOMY 21: Status: ACTIVE | Noted: 2024-09-04

## 2024-09-04 PROCEDURE — 99396 PREV VISIT EST AGE 40-64: CPT | Performed by: INTERNAL MEDICINE

## 2024-09-04 PROCEDURE — 3725F SCREEN DEPRESSION PERFORMED: CPT | Performed by: INTERNAL MEDICINE

## 2024-09-04 NOTE — PATIENT INSTRUCTIONS
"Patient Education     Routine physical for adults   The Basics   Written by the doctors and editors at [facility]   What is a physical? -- A physical is a routine visit, or \"check-up,\" with your doctor. You might also hear it called a \"wellness visit\" or \"preventive visit.\"  During each visit, the doctor will:   Ask about your physical and mental health   Ask about your habits, behaviors, and lifestyle   Do an exam   Give you vaccines if needed   Talk to you about any medicines you take   Give advice about your health   Answer your questions  Getting regular check-ups is an important part of taking care of your health. It can help your doctor find and treat any problems you have. But it's also important for preventing health problems.  A routine physical is different from a \"sick visit.\" A sick visit is when you see a doctor because of a health concern or problem. Since physicals are scheduled ahead of time, you can think about what you want to ask the doctor.  How often should I get a physical? -- It depends on your age and health. In general, for people age 21 years and older:   If you are younger than 50 years, you might be able to get a physical every 3 years.   If you are 50 years or older, your doctor might recommend a physical every year.  If you have an ongoing health condition, like diabetes or high blood pressure, your doctor will probably want to see you more often.  What happens during a physical? -- In general, each visit will include:   Physical exam - The doctor or nurse will check your height, weight, heart rate, and blood pressure. They will also look at your eyes and ears. They will ask about how you are feeling and whether you have any symptoms that bother you.   Medicines - It's a good idea to bring a list of all the medicines you take to each doctor visit. Your doctor will talk to you about your medicines and answer any questions. Tell them if you are having any side effects that bother you. You " "should also tell them if you are having trouble paying for any of your medicines.   Habits and behaviors - This includes:   Your diet   Your exercise habits   Whether you smoke, drink alcohol, or use drugs   Whether you are sexually active   Whether you feel safe at home  Your doctor will talk to you about things you can do to improve your health and lower your risk of health problems. They will also offer help and support. For example, if you want to quit smoking, they can give you advice and might prescribe medicines. If you want to improve your diet or get more physical activity, they can help you with this, too.   Lab tests, if needed - The tests you get will depend on your age and situation. For example, your doctor might want to check your:   Cholesterol   Blood sugar   Iron level   Vaccines - The recommended vaccines will depend on your age, health, and what vaccines you already had. Vaccines are very important because they can prevent certain serious or deadly infections.   Discussion of screening - \"Screening\" means checking for diseases or other health problems before they cause symptoms. Your doctor can recommend screening based on your age, risk, and preferences. This might include tests to check for:   Cancer, such as breast, prostate, cervical, ovarian, colorectal, prostate, lung, or skin cancer   Sexually transmitted infections, such as chlamydia and gonorrhea   Mental health conditions like depression and anxiety  Your doctor will talk to you about the different types of screening tests. They can help you decide which screenings to have. They can also explain what the results might mean.   Answering questions - The physical is a good time to ask the doctor or nurse questions about your health. If needed, they can refer you to other doctors or specialists, too.  Adults older than 65 years often need other care, too. As you get older, your doctor will talk to you about:   How to prevent falling at " home   Hearing or vision tests   Memory testing   How to take your medicines safely   Making sure that you have the help and support you need at home  All topics are updated as new evidence becomes available and our peer review process is complete.  This topic retrieved from [employer] on: May 02, 2024.  Topic 145197 Version 1.0  Release: 32.4.3 - C32.122  © 2024 UpToDate, Inc. and/or its affiliates. All rights reserved.  Consumer Information Use and Disclaimer   Disclaimer: This generalized information is a limited summary of diagnosis, treatment, and/or medication information. It is not meant to be comprehensive and should be used as a tool to help the user understand and/or assess potential diagnostic and treatment options. It does NOT include all information about conditions, treatments, medications, side effects, or risks that may apply to a specific patient. It is not intended to be medical advice or a substitute for the medical advice, diagnosis, or treatment of a health care provider based on the health care provider's examination and assessment of a patient's specific and unique circumstances. Patients must speak with a health care provider for complete information about their health, medical questions, and treatment options, including any risks or benefits regarding use of medications. This information does not endorse any treatments or medications as safe, effective, or approved for treating a specific patient. UpToDate, Inc. and its affiliates disclaim any warranty or liability relating to this information or the use thereof.The use of this information is governed by the Terms of Use, available at https://www.woltersH2Mobuwer.com/en/know/clinical-effectiveness-terms. 2024© UpToDate, Inc. and its affiliates and/or licensors. All rights reserved.  Copyright   © 2024 UpToDate, Inc. and/or its affiliates. All rights reserved.

## 2024-09-04 NOTE — PROGRESS NOTES
Adult Annual Physical  Name: Uyen Ruvalcaba      : 1980      MRN: [id number]  Encounter Provider: Martell Carranza MD  Encounter Date: 2024   Encounter department: [facility] INTERNAL MEDICINE    Assessment & Plan   1. Annual physical exam  -     CBC and differential; Future  -     Comprehensive metabolic panel; Future  -     Lipid Panel with Direct LDL reflex; Future  -     TSH, 3rd generation; Future  -     Hemoglobin A1C; Future  2. Mixed hyperlipidemia  -     CBC and differential; Future  -     Comprehensive metabolic panel; Future  -     Lipid Panel with Direct LDL reflex; Future  -     TSH, 3rd generation; Future  -     Hemoglobin A1C; Future  3. Impaired fasting blood sugar  -     CBC and differential; Future  -     Comprehensive metabolic panel; Future  -     Lipid Panel with Direct LDL reflex; Future  -     TSH, 3rd generation; Future  -     Hemoglobin A1C; Future  Immunizations and preventive care screenings were discussed with patient today. Appropriate education was printed on patient's after visit summary.    Counseling:  Exercise: the importance of regular exercise/physical activity was discussed. Recommend exercise 3-5 times per week for at least 30 minutes.       Depression Screening and Follow-up Plan: Patient was screened for depression during today's encounter. They screened negative with a PHQ-2 score of 0.        History of Present Illness     Adult Annual Physical:  Patient presents for annual physical. physical.     Diet and Physical Activity:  - Diet/Nutrition: well balanced diet.  - Exercise: moderate cardiovascular exercise.    Depression Screening:  - PHQ-2 Score: 0    General Health:  - Sleep: sleeps well.  - Hearing: normal hearing bilateral ears.  - Vision: no vision problems.  - Dental: regular dental visits.    /GYN Health:  - Follows with GYN: yes.   - History of STDs: no    Advanced Care Planning:  - Has an advanced directive?: no      Review of Systems    Constitutional:  Negative for chills and fever.   HENT:  Negative for congestion, ear pain and sore throat.    Eyes:  Negative for pain.   Respiratory:  Negative for cough and shortness of breath.    Cardiovascular:  Negative for chest pain and leg swelling.   Gastrointestinal:  Negative for abdominal pain, nausea and vomiting.   Endocrine: Negative for polyuria.   Genitourinary:  Negative for difficulty urinating, frequency and urgency.   Musculoskeletal:  Negative for arthralgias and back pain.   Skin:  Negative for rash.   Neurological:  Negative for weakness and headaches.   Psychiatric/Behavioral:  Negative for sleep disturbance. The patient is not nervous/anxious.      Past Medical History   Past Medical History:   Diagnosis Date    Abnormal Pap smear of cervix     approx     C5 vertebral fracture (HCC)     Last Assessed:2016    Cyst of ovary, right 2007    HPV (human papilloma virus) infection     Substance abuse (HCC)     hx of cocaine use- 20 yrs ago    Varicella     chickenpox - childhood     Past Surgical History:   Procedure Laterality Date    COLPOSCOPY  2019    neg    DILATION AND CURETTAGE OF UTERUS      IAB    NECK SURGERY  2015    1.Anterior C5 corpectomy with greater than 80% of the verebral body removed2.C4-C5,C5-C6(2 level)ianterior cervical discectomy3.Placement of -C6(2 level) interbody PEEK cage for 2 level interbody fusion4.C4-C6(2 level) anterior titanium plate and screw instrumentation for anterior cervical fusion5.Use of microscopy for microdissection/corpectomy6.Harvesting of autologous bone for                      NE INDUCED  DILATION & EVACUATION N/A 2024    Procedure: DILATATION AND EVACUATION 18 WEEKS, EXAM UNDER ANESTHESIA;  Surgeon: Saumya Anderson MD;  Location: [facility];  Service: Gynecology     Family History   Problem Relation Age of Onset    No Known Problems Mother     No Known Problems Father     No Known Problems Sister     No  Known Problems Brother     Heart attack Maternal Grandmother     Dementia Maternal Grandmother     Other Maternal Grandfather         mesothelioma    Cancer Maternal Grandfather     Heart attack Paternal Grandfather     No Known Problems Maternal Aunt     No Known Problems Paternal Aunt     Diabetes Family     Breast cancer Neg Hx     Colon cancer Neg Hx     Ovarian cancer Neg Hx      Current Outpatient Medications on File Prior to Visit   Medication Sig Dispense Refill    multivitamin (THERAGRAN) TABS Take 1 tablet by mouth daily      norethindrone (Tanisha) 0.35 MG tablet Take 1 tablet (0.35 mg total) by mouth daily 28 tablet 11    [DISCONTINUED] Omega-3 Fatty Acids (Fish Oil) 1200 MG CAPS Take by mouth in the morning      [DISCONTINUED] Prenatal Vit-Fe Fumarate-FA ( Prenatal Vitamins) 28-0.8 MG TABS Take by mouth in the morning       No current facility-administered medications on file prior to visit.   No Known Allergies   Current Outpatient Medications on File Prior to Visit   Medication Sig Dispense Refill    multivitamin (THERAGRAN) TABS Take 1 tablet by mouth daily      norethindrone (Tanisha) 0.35 MG tablet Take 1 tablet (0.35 mg total) by mouth daily 28 tablet 11    [DISCONTINUED] Omega-3 Fatty Acids (Fish Oil) 1200 MG CAPS Take by mouth in the morning      [DISCONTINUED] Prenatal Vit-Fe Fumarate-FA ( Prenatal Vitamins) 28-0.8 MG TABS Take by mouth in the morning       No current facility-administered medications on file prior to visit.      Social History     Tobacco Use    Smoking status: Former     Types: Cigarettes    Smokeless tobacco: Never    Tobacco comments:     socially   Vaping Use    Vaping status: Never Used   Substance and Sexual Activity    Alcohol use: Not Currently    Drug use: Not Currently     Types: Cocaine    Sexual activity: Yes     Partners: Male     Birth control/protection: OCP     Comment: truong- - conceived on progesterone pill only       Objective     /80 (BP  "Location: Left arm, Patient Position: Sitting, Cuff Size: Standard)   Pulse 74   Temp 98.4 °F (36.9 °C) (Temporal)   Ht 5' 5\" (1.651 m)   Wt 78.9 kg (174 lb)   SpO2 99%   BMI 28.96 kg/m²     Physical Exam  Vitals and nursing note reviewed.   Constitutional:       General: She is not in acute distress.     Appearance: She is well-developed.   HENT:      Head: Normocephalic and atraumatic.      Right Ear: Tympanic membrane, ear canal and external ear normal.      Left Ear: Tympanic membrane, ear canal and external ear normal.      Mouth/Throat:      Mouth: Mucous membranes are moist.      Pharynx: Oropharynx is clear.   Eyes:      Extraocular Movements: Extraocular movements intact.      Conjunctiva/sclera: Conjunctivae normal.   Cardiovascular:      Rate and Rhythm: Normal rate and regular rhythm.      Heart sounds: Normal heart sounds. No murmur heard.  Pulmonary:      Effort: Pulmonary effort is normal. No respiratory distress.      Breath sounds: Normal breath sounds. No wheezing or rales.   Abdominal:      General: Abdomen is flat. There is no distension.      Palpations: Abdomen is soft.      Tenderness: There is no abdominal tenderness.   Musculoskeletal:         General: No swelling.      Cervical back: Neck supple.      Right lower leg: No edema.      Left lower leg: No edema.   Skin:     General: Skin is warm and dry.      Capillary Refill: Capillary refill takes less than 2 seconds.   Neurological:      General: No focal deficit present.      Mental Status: She is alert and oriented to person, place, and time.   Psychiatric:         Mood and Affect: Mood normal.         "

## 2024-10-25 ENCOUNTER — HOSPITAL ENCOUNTER (OUTPATIENT)
Dept: MAMMOGRAPHY | Facility: HOSPITAL | Age: 44
Discharge: HOME | End: 2024-10-25
Payer: COMMERCIAL

## 2024-10-25 VITALS — BODY MASS INDEX: 28.99 KG/M2 | WEIGHT: 174 LBS | HEIGHT: 65 IN

## 2024-10-25 DIAGNOSIS — Z12.31 ENCOUNTER FOR SCREENING MAMMOGRAM FOR MALIGNANT NEOPLASM OF BREAST: ICD-10-CM

## 2024-10-25 PROCEDURE — 77063 BREAST TOMOSYNTHESIS BI: CPT

## 2024-10-25 PROCEDURE — 77067 SCR MAMMO BI INCL CAD: CPT

## 2024-12-08 DIAGNOSIS — Z30.41 ENCOUNTER FOR SURVEILLANCE OF CONTRACEPTIVE PILLS: ICD-10-CM

## 2024-12-09 RX ORDER — NORETHINDRONE 0.35 MG/1
1 TABLET ORAL DAILY
Qty: 84 TABLET | Refills: 1 | Status: SHIPPED | OUTPATIENT
Start: 2024-12-09

## 2024-12-13 ENCOUNTER — RESULTS FOLLOW-UP (OUTPATIENT)
Dept: OBGYN CLINIC | Facility: CLINIC | Age: 44
End: 2024-12-13

## 2024-12-13 ENCOUNTER — HOSPITAL ENCOUNTER (OUTPATIENT)
Dept: MAMMOGRAPHY | Facility: CLINIC | Age: 44
End: 2024-12-13
Payer: COMMERCIAL

## 2024-12-13 ENCOUNTER — HOSPITAL ENCOUNTER (OUTPATIENT)
Dept: ULTRASOUND IMAGING | Facility: CLINIC | Age: 44
End: 2024-12-13
Payer: COMMERCIAL

## 2024-12-13 DIAGNOSIS — R92.8 ABNORMAL MAMMOGRAM: ICD-10-CM

## 2024-12-13 PROCEDURE — G0279 TOMOSYNTHESIS, MAMMO: HCPCS

## 2024-12-13 PROCEDURE — 77065 DX MAMMO INCL CAD UNI: CPT

## 2024-12-13 PROCEDURE — 76642 ULTRASOUND BREAST LIMITED: CPT

## 2024-12-19 ENCOUNTER — OFFICE VISIT (OUTPATIENT)
Age: 44
End: 2024-12-19

## 2024-12-19 VITALS
DIASTOLIC BLOOD PRESSURE: 69 MMHG | BODY MASS INDEX: 28.29 KG/M2 | WEIGHT: 170 LBS | SYSTOLIC BLOOD PRESSURE: 110 MMHG | TEMPERATURE: 97.1 F | HEART RATE: 99 BPM

## 2024-12-19 DIAGNOSIS — R21 RASH: ICD-10-CM

## 2024-12-19 DIAGNOSIS — L92.0 GRANULOMA ANNULARE: Primary | ICD-10-CM

## 2024-12-19 PROCEDURE — 88305 TISSUE EXAM BY PATHOLOGIST: CPT | Performed by: PATHOLOGY

## 2024-12-19 RX ORDER — CLOBETASOL PROPIONATE 0.5 MG/G
CREAM TOPICAL 2 TIMES DAILY
Qty: 60 G | Refills: 6 | Status: SHIPPED | OUTPATIENT
Start: 2024-12-19

## 2024-12-19 NOTE — PROGRESS NOTES
"[facility] Dermatology Clinic Note     Patient Name: Uyen Ruvalcaba  Encounter Date: 12/19/24     Have you been cared for by a [facility] Dermatologist in the last 3 years and, if so, which description applies to you?    NO.   I am considered a \"new\" patient and must complete all patient intake questions. I am FEMALE/of child-bearing potential.    REVIEW OF SYSTEMS:  Have you recently had or currently have any of the following? Recent fever or chills? No  Any non-healing wound? No  Are you pregnant or planning to become pregnant? No  Are you currently or planning to be nursing or breast feeding? No   PAST MEDICAL HISTORY:  Have you personally ever had or currently have any of the following?  If \"YES,\" then please provide more detail. Skin cancer (such as Melanoma, Basal Cell Carcinoma, Squamous Cell Carcinoma?  No  Tuberculosis, HIV/AIDS, Hepatitis B or C: No  Radiation Treatment No   HISTORY OF IMMUNOSUPPRESSION:   Do you have a history of any of the following:  Systemic Immunosuppression such as Diabetes, Biologic or Immunotherapy, Chemotherapy, Organ Transplantation, Bone Marrow Transplantation or Prednsione?  No    Answering \"YES\" requires the addition of the dotphrase \"IMMUNOSUPPRESSED\" as the first diagnosis of the patient's visit.   FAMILY HISTORY:  Any \"first degree relatives\" (parent, brother, sister, or child) with the following?    Skin Cancer, Pancreatic or Other Cancer? No   PATIENT EXPERIENCE:    Do you want the Dermatologist to perform a COMPLETE skin exam today including a clinical examination under the \"bra and underwear\" areas?  no  If necessary, do we have your permission to call and leave a detailed message on your Preferred Phone number that includes your specific medical information?  Yes      No Known Allergies   Current Outpatient Medications:   •  clobetasol (TEMOVATE) 0.05 % cream, Apply topically 2 (two) times a day To the affected areas discussed, Disp: 60 g, Rfl: 6  •  Incassia 0.35 MG " "tablet, TAKE 1 TABLET BY MOUTH EVERY DAY, Disp: 84 tablet, Rfl: 1  •  multivitamin (THERAGRAN) TABS, Take 1 tablet by mouth daily, Disp: , Rfl:   •  SEMAGLUTIDE,0.25 OR 0.5MG/DOS, SC, , Disp: , Rfl:           Whom besides the patient is providing clinical information about today's encounter?   NO ADDITIONAL HISTORIAN (patient alone provided history)    Physical Exam and Assessment/Plan by Diagnosis:      NEOPLASM OF UNCERTAIN BEHAVIOR OF SKIN vs RASH    Physical Exam:  (Anatomic Location); (Size and Morphological Description); (Differential Diagnosis):  Left upper arm: annular to arcuate plaques on the upper arms DDX: granuloma annulare vs other grannlumas dermatitis   Pertinent Positives:  Pertinent Negatives:    Additional History of Present Condition:  Patient notes that spots started appearing on trunk area and arms years ago; started to spread in the last 6 months; doesn't bother patient; patient applied cortisone cream but didn't work, patient applies lotion, no prior tx      Assessment and Plan:  I have discussed with the patient that a sample of skin via a \"skin biopsy” would be potentially helpful to further make a specific diagnosis under the microscope.  Based on a thorough discussion of this condition and the management approach to it (including a comprehensive discussion of the known risks, side effects and potential benefits of treatment), the patient (family) agrees to implement the following specific plan:    Procedure:  Skin Biopsy.  After a thorough discussion of treatment options and risk/benefits/alternatives (including but not limited to local pain, scarring, dyspigmentation, blistering, possible superinfection, and inability to confirm a diagnosis via histopathology), verbal and written consent were obtained and portion of the rash was biopsied for tissue sample.  See below for consent that was obtained from patient and subsequent Procedure Note.    PROCEDURE NOTE:  PUNCH BIOPSY      Performing " Physician:     Anatomic Location; Clinical Description with size (cm); Pre-Op Diagnosis:    ATTENTION:  DERMPATH GROUP    SPECIMEN A; Skin; Anatomic Location: left upper arm; Procedure/Protocol: Skin Specimen (submit in [facility]):Punch Biopsy (when a punch biopsy tool is used; simple closure is included) (CPT 74130; each additional punch biopsy is CPT 73388)   44 y.o. year old  Female with a Morphological Description: annular to arcuate plaques on the upper arms  Differential Diagnosis and/or Specific Clinical Question: granuloma annular vs other grannlumas dermatitis       Anesthesia: 1% xylocaine with epi       Topical anesthesia: None       Indications: To indicate diagnosis and management plan.    Procedure Details     Patient informed of the risks (including bleeding,scaring and infection) and benefits of the procedure explained. Verbal and written informed consent obtained. The area was prepped and draped in the usual fashion. Anesthesia was obtained with 1% lidocaine with epinephrine. The skin was then stretched perpendicular to the skin tension lines and a punch biopsy to an appropriate sampling depth was obtained with a 4 mm punch with a forceps and iris scissors.     Hemostasis was obtained with 4-0 Prolene x 2 sutures.     Complications:  None      Specimen has been sent for review by Dermatopathology.      Plan:  1. Instructed to keep the wound dry and covered for 24-48h and clean thereafter.  2. Warning signs of infection were reviewed.    3. Recommended that the patient use acetaminophen as needed for pain  4. Sutures if any should be removed in 14 days      Standard post-procedure care has been explained and has been included in written form within the patient's copy of Informed Consent.      Scribe Attestation    I,:  Kalli Stiles am acting as a scribe while in the presence of the attending physician.:       I,:  Patricio Garcia DO personally performed the services described in this  documentation    as scribed in my presence.:

## 2024-12-19 NOTE — PATIENT INSTRUCTIONS
"NEOPLASM OF UNCERTAIN BEHAVIOR OF SKIN vs RASH    Assessment and Plan:  I have discussed with the patient that a sample of skin via a \"skin biopsy” would be potentially helpful to further make a specific diagnosis under the microscope.  Based on a thorough discussion of this condition and the management approach to it (including a comprehensive discussion of the known risks, side effects and potential benefits of treatment), the patient (family) agrees to implement the following specific plan:    Procedure:  Skin Biopsy.  After a thorough discussion of treatment options and risk/benefits/alternatives (including but not limited to local pain, scarring, dyspigmentation, blistering, possible superinfection, and inability to confirm a diagnosis via histopathology), verbal and written consent were obtained and portion of the rash was biopsied for tissue sample.  See below for consent that was obtained from patient and subsequent Procedure Note.    Plan:  1. Instructed to keep the wound dry and covered for 24-48h and clean thereafter.  2. Warning signs of infection were reviewed.    3. Recommended that the patient use acetaminophen as needed for pain  4. Sutures if any should be removed in 14 days      Standard post-procedure care has been explained and has been included in written form within the patient's copy of Informed Consent.  "

## 2024-12-23 PROCEDURE — 88305 TISSUE EXAM BY PATHOLOGIST: CPT | Performed by: PATHOLOGY

## 2024-12-27 ENCOUNTER — PATIENT MESSAGE (OUTPATIENT)
Age: 44
End: 2024-12-27

## 2024-12-27 ENCOUNTER — RESULTS FOLLOW-UP (OUTPATIENT)
Dept: DERMATOLOGY | Facility: CLINIC | Age: 44
End: 2024-12-27

## 2025-02-17 NOTE — PATIENT INSTRUCTIONS
Congratulations!! Please review our Pregnancy Essential Guide and [facility] L&D Virtual tour from our networks website.     [facility] P[facility] Essentials Guide  [facility] ([email or web address])     [facility] - Virtual Tour ([email or web address])        Pregnancy at 11 to 14 Weeks   AMBULATORY CARE:   Changes happening to your body:  You are now at the end of your first trimester and entering your second trimester. Morning sickness usually goes away by this time. You may have other symptoms such as fatigue, frequent urination, and headaches. You may have gained 2 to 4 pounds by now.  Seek care immediately if:   You have pain or cramping in your abdomen or low back.    You have heavy vaginal bleeding or clotting.    You pass material that looks like tissue or large clots. Collect the material and bring it with you.    Call your doctor or obstetrician if:   You cannot keep food or drinks down, and you are losing weight.    You have light vaginal bleeding.    You have chills or a fever.    You have vaginal itching, burning, or pain.    You have yellow, green, white, or foul-smelling vaginal discharge.    You have pain or burning when you urinate, less urine than usual, or pink or bloody urine.    You have questions or concerns about your condition or care.    How to care for yourself at this stage of your pregnancy:       Get plenty of rest.  You may feel more tired than normal. You may need to take naps or go to bed earlier.    Manage nausea and vomiting.  Avoid fatty and spicy foods. Eat small meals throughout the day instead of large meals. Mariajose may help to decrease nausea. Ask your healthcare provider about other ways of decreasing nausea and vomiting.    Eat a variety of healthy foods.  Healthy foods include fruits, vegetables, whole-grain breads, low-fat dairy foods, beans, lean meats, and fish. Drink liquids as directed. Ask how much liquid to drink each day and which liquids are best for you.  Left detailed note stating that patient is cleared for surgery. Left clinic number if patient has any questions or concerns.     ----- Message from RORY Jenkins sent at 2/17/2025  6:32 AM CST -----  Preoperative labs were normal.  Patient is medically optimized for surgery, preop note already sent over to surgeon.   Limit caffeine to less than 200 milligrams each day. Limit your intake of fish to 2 servings each week. Choose fish low in mercury such as canned light tuna, shrimp, salmon, cod, or tilapia. Do not  eat fish high in mercury such as swordfish, tilefish, chapincito mackerel, and shark.         Take prenatal vitamins as directed.  Your need for certain vitamins and minerals, such as folic acid, increases during pregnancy. Prenatal vitamins provide some of the extra vitamins and minerals you need. Prenatal vitamins may also help to decrease the risk of certain birth defects.         Do not smoke.  Smoking increases your risk of a miscarriage and other health problems during your pregnancy. Smoking can cause your baby to be born too early or weigh less at birth. Ask your healthcare provider for information if you need help quitting.    Do not drink alcohol.  Alcohol passes from your body to your baby through the placenta. It can affect your baby's brain development and cause fetal alcohol syndrome (FAS). FAS is a group of conditions that causes mental, behavior, and growth problems.    Talk to your healthcare provider before you take any medicines.  Many medicines may harm your baby if you take them when you are pregnant. Do not take any medicines, vitamins, herbs, or supplements without first talking to your healthcare provider. Never use illegal or street drugs (such as marijuana or cocaine) while you are pregnant.  Safety tips during pregnancy:   Avoid hot tubs and saunas.  Do not use a hot tub or sauna while you are pregnant, especially during your first trimester. Hot tubs and saunas may raise your baby's temperature and increase the risk of birth defects.    Avoid toxoplasmosis.  This is an infection caused by eating raw meat or being around infected cat feces. It can cause birth defects, miscarriages, and other problems. Wash your hands after you touch raw meat. Make sure any meat is well-cooked before you eat it. Avoid  raw eggs and unpasteurized milk. Use gloves or ask someone else to clean your cat's litter box while you are pregnant.    Changes happening with your baby:  Your baby has fully formed fingernails and toenails. Your baby's heartbeat can now be heard. Ask your healthcare provider if you can listen to your baby's heartbeat. By week 14, your baby is over 4 inches long from the top of the head to the rump (baby's bottom). Your baby weighs over 3 ounces.  Prenatal care:  Prenatal care is a series of visits with your healthcare provider throughout your pregnancy. During the first 28 weeks of your pregnancy, you will see your healthcare provider 1 time each month. Prenatal care can help prevent problems during pregnancy and childbirth. Your healthcare provider will check your blood pressure and weight. Your baby's heart rate will also be checked. You may also need the following at some visits:  A pelvic exam  allows your healthcare provider to see your cervix (the bottom part of your uterus). Your healthcare provider will use a speculum to open your vagina. He or she will check the size and shape of your uterus.    Blood tests  may be done to check for any of the following:    Gestational diabetes or anemia (low iron level)    Blood type or Rh factor, or certain birth defects    Immunity to certain diseases, such as chickenpox or rubella    An infection, such as a sexually transmitted infection, HIV, or hepatitis B    Hepatitis B  may need to be prevented or treated. Hepatitis B is inflammation of the liver caused by the hepatitis B virus (HBV). HBV can spread from a mother to her baby during delivery. You will be checked for HBV as early as possible in the first trimester of each pregnancy. You need the test even if you received the hepatitis B vaccine or were tested before. You may need to have an HBV infection treated before you give birth.    Urine tests  may also be done to check for sugar and protein. These can be  signs of gestational diabetes or preeclampsia. Urine tests may also be done to check for signs of infection.    A fetal ultrasound  shows pictures of your baby inside your uterus. The pictures are used to check your baby's development, movement, and position.         Genetic disorder screening tests  may be offered to you. These tests check your baby's risk for genetic disorders such as Down syndrome. A screening test includes a blood test and ultrasound.    Follow up with your doctor or obstetrician as directed:  Go to all prenatal visits. Write down your questions so you remember to ask them during your visits.  © Copyright [employer] 2023 Information is for End User's use only and may not be sold, redistributed or otherwise used for commercial purposes.  The above information is an  only. It is not intended as medical advice for individual conditions or treatments. Talk to your doctor, nurse or pharmacist before following any medical regimen to see if it is safe and effective for you.

## 2025-02-20 DIAGNOSIS — Z00.6 ENCOUNTER FOR EXAMINATION FOR NORMAL COMPARISON OR CONTROL IN CLINICAL RESEARCH PROGRAM: ICD-10-CM

## 2025-06-13 ENCOUNTER — APPOINTMENT (OUTPATIENT)
Dept: LAB | Facility: CLINIC | Age: 45
End: 2025-06-13

## 2025-06-13 DIAGNOSIS — Z00.6 ENCOUNTER FOR EXAMINATION FOR NORMAL COMPARISON OR CONTROL IN CLINICAL RESEARCH PROGRAM: ICD-10-CM

## 2025-06-13 PROCEDURE — 36415 COLL VENOUS BLD VENIPUNCTURE: CPT

## 2025-06-20 ENCOUNTER — APPOINTMENT (OUTPATIENT)
Dept: LAB | Facility: CLINIC | Age: 45
End: 2025-06-20
Payer: COMMERCIAL

## 2025-06-20 DIAGNOSIS — E78.2 MIXED HYPERLIPIDEMIA: ICD-10-CM

## 2025-06-20 DIAGNOSIS — R73.01 IMPAIRED FASTING BLOOD SUGAR: ICD-10-CM

## 2025-06-20 DIAGNOSIS — Z00.00 ANNUAL PHYSICAL EXAM: ICD-10-CM

## 2025-06-20 LAB
ALBUMIN SERPL BCG-MCNC: 4.1 G/DL (ref 3.5–5)
ALP SERPL-CCNC: 52 U/L (ref 34–104)
ALT SERPL W P-5'-P-CCNC: 26 U/L (ref 7–52)
ANION GAP SERPL CALCULATED.3IONS-SCNC: 7 MMOL/L (ref 4–13)
AST SERPL W P-5'-P-CCNC: 24 U/L (ref 13–39)
BASOPHILS # BLD AUTO: 0.04 THOUSANDS/ÂΜL (ref 0–0.1)
BASOPHILS NFR BLD AUTO: 1 % (ref 0–1)
BILIRUB SERPL-MCNC: 0.45 MG/DL (ref 0.2–1)
BUN SERPL-MCNC: 15 MG/DL (ref 5–25)
CALCIUM SERPL-MCNC: 8.9 MG/DL (ref 8.4–10.2)
CHLORIDE SERPL-SCNC: 103 MMOL/L (ref 96–108)
CHOLEST SERPL-MCNC: 130 MG/DL (ref ?–200)
CO2 SERPL-SCNC: 26 MMOL/L (ref 21–32)
CREAT SERPL-MCNC: 0.74 MG/DL (ref 0.6–1.3)
EOSINOPHIL # BLD AUTO: 0.08 THOUSAND/ÂΜL (ref 0–0.61)
EOSINOPHIL NFR BLD AUTO: 1 % (ref 0–6)
ERYTHROCYTE [DISTWIDTH] IN BLOOD BY AUTOMATED COUNT: 13.3 % (ref 11.6–15.1)
EST. AVERAGE GLUCOSE BLD GHB EST-MCNC: 100 MG/DL
GFR SERPL CREATININE-BSD FRML MDRD: 99 ML/MIN/1.73SQ M
GLUCOSE P FAST SERPL-MCNC: 87 MG/DL (ref 65–99)
HBA1C MFR BLD: 5.1 %
HCT VFR BLD AUTO: 40.6 % (ref 34.8–46.1)
HDLC SERPL-MCNC: 71 MG/DL
HGB BLD-MCNC: 13.9 G/DL (ref 11.5–15.4)
IMM GRANULOCYTES # BLD AUTO: 0.02 THOUSAND/UL (ref 0–0.2)
IMM GRANULOCYTES NFR BLD AUTO: 0 % (ref 0–2)
LDLC SERPL CALC-MCNC: 46 MG/DL (ref 0–100)
LYMPHOCYTES # BLD AUTO: 1.37 THOUSANDS/ÂΜL (ref 0.6–4.47)
LYMPHOCYTES NFR BLD AUTO: 24 % (ref 14–44)
MCH RBC QN AUTO: 31.7 PG (ref 26.8–34.3)
MCHC RBC AUTO-ENTMCNC: 34.2 G/DL (ref 31.4–37.4)
MCV RBC AUTO: 93 FL (ref 82–98)
MONOCYTES # BLD AUTO: 0.64 THOUSAND/ÂΜL (ref 0.17–1.22)
MONOCYTES NFR BLD AUTO: 11 % (ref 4–12)
NEUTROPHILS # BLD AUTO: 3.67 THOUSANDS/ÂΜL (ref 1.85–7.62)
NEUTS SEG NFR BLD AUTO: 63 % (ref 43–75)
NRBC BLD AUTO-RTO: 0 /100 WBCS
PLATELET # BLD AUTO: 273 THOUSANDS/UL (ref 149–390)
PMV BLD AUTO: 11 FL (ref 8.9–12.7)
POTASSIUM SERPL-SCNC: 4.2 MMOL/L (ref 3.5–5.3)
PROT SERPL-MCNC: 6.3 G/DL (ref 6.4–8.4)
RBC # BLD AUTO: 4.39 MILLION/UL (ref 3.81–5.12)
SODIUM SERPL-SCNC: 136 MMOL/L (ref 135–147)
TRIGL SERPL-MCNC: 66 MG/DL (ref ?–150)
TSH SERPL DL<=0.05 MIU/L-ACNC: 1.25 UIU/ML (ref 0.45–4.5)
WBC # BLD AUTO: 5.82 THOUSAND/UL (ref 4.31–10.16)

## 2025-06-20 PROCEDURE — 80061 LIPID PANEL: CPT

## 2025-06-20 PROCEDURE — 36415 COLL VENOUS BLD VENIPUNCTURE: CPT

## 2025-06-20 PROCEDURE — 80053 COMPREHEN METABOLIC PANEL: CPT

## 2025-06-20 PROCEDURE — 83036 HEMOGLOBIN GLYCOSYLATED A1C: CPT

## 2025-06-20 PROCEDURE — 85025 COMPLETE CBC W/AUTO DIFF WBC: CPT

## 2025-06-20 PROCEDURE — 84443 ASSAY THYROID STIM HORMONE: CPT

## 2025-06-24 LAB
APOB+LDLR+PCSK9 GENE MUT ANL BLD/T: NOT DETECTED
BRCA1+BRCA2 DEL+DUP + FULL MUT ANL BLD/T: NOT DETECTED
MLH1+MSH2+MSH6+PMS2 GN DEL+DUP+FUL M: NOT DETECTED

## 2025-08-21 ENCOUNTER — OFFICE VISIT (OUTPATIENT)
Dept: OBGYN CLINIC | Facility: CLINIC | Age: 45
End: 2025-08-21
Payer: COMMERCIAL

## 2025-08-21 VITALS
WEIGHT: 154 LBS | BODY MASS INDEX: 25.66 KG/M2 | DIASTOLIC BLOOD PRESSURE: 70 MMHG | HEIGHT: 65 IN | SYSTOLIC BLOOD PRESSURE: 114 MMHG

## 2025-08-21 DIAGNOSIS — Z30.41 ENCOUNTER FOR SURVEILLANCE OF CONTRACEPTIVE PILLS: ICD-10-CM

## 2025-08-21 DIAGNOSIS — Z12.31 ENCOUNTER FOR SCREENING MAMMOGRAM FOR MALIGNANT NEOPLASM OF BREAST: ICD-10-CM

## 2025-08-21 DIAGNOSIS — Z01.419 ENCOUNTER FOR GYNECOLOGICAL EXAMINATION WITHOUT ABNORMAL FINDING: Primary | ICD-10-CM

## 2025-08-21 PROBLEM — Z98.890 S/P DILATION AND CURETTAGE: Status: RESOLVED | Noted: 2024-06-05 | Resolved: 2025-08-21

## 2025-08-21 PROBLEM — Q90.9 TRISOMY 21: Status: RESOLVED | Noted: 2024-09-04 | Resolved: 2025-08-21

## 2025-08-21 PROCEDURE — S0612 ANNUAL GYNECOLOGICAL EXAMINA: HCPCS | Performed by: OBSTETRICS & GYNECOLOGY

## 2025-08-21 RX ORDER — ACETAMINOPHEN AND CODEINE PHOSPHATE 120; 12 MG/5ML; MG/5ML
1 SOLUTION ORAL DAILY
Qty: 84 TABLET | Refills: 4 | Status: SHIPPED | OUTPATIENT
Start: 2025-08-21 | End: 2025-08-25 | Stop reason: SDUPTHER

## 2025-08-21 RX ORDER — ACETAMINOPHEN AND CODEINE PHOSPHATE 120; 12 MG/5ML; MG/5ML
1 SOLUTION ORAL DAILY
Qty: 84 TABLET | Refills: 4 | Status: SHIPPED | OUTPATIENT
Start: 2025-08-21 | End: 2025-08-21 | Stop reason: SDUPTHER

## (undated) DEVICE — GLOVE INDICATOR PI UNDERGLOVE SZ 6.5 BLUE

## (undated) DEVICE — PVC URETHRAL CATHETER: Brand: DOVER

## (undated) DEVICE — GLOVE PI ULTRA TOUCH SZ.7.0

## (undated) DEVICE — BETHLEHEM UNIVERSAL MINOR VAG: Brand: CARDINAL HEALTH